# Patient Record
Sex: MALE | Race: WHITE | NOT HISPANIC OR LATINO | Employment: FULL TIME | URBAN - METROPOLITAN AREA
[De-identification: names, ages, dates, MRNs, and addresses within clinical notes are randomized per-mention and may not be internally consistent; named-entity substitution may affect disease eponyms.]

---

## 2018-05-14 LAB
HDLC SERPL-MCNC: 39 MG/DL (ref 35–70)
LDLC SERPL CALC-MCNC: 94 MG/DL (ref 0–160)

## 2018-10-22 ENCOUNTER — TELEPHONE (OUTPATIENT)
Dept: INTERNAL MEDICINE | Facility: CLINIC | Age: 53
End: 2018-10-22

## 2018-10-22 ENCOUNTER — CLINICAL SUPPORT (OUTPATIENT)
Dept: INTERNAL MEDICINE | Facility: CLINIC | Age: 53
End: 2018-10-22
Payer: COMMERCIAL

## 2018-10-22 ENCOUNTER — OFFICE VISIT (OUTPATIENT)
Dept: INTERNAL MEDICINE | Facility: CLINIC | Age: 53
End: 2018-10-22
Payer: COMMERCIAL

## 2018-10-22 DIAGNOSIS — Z00.00 ROUTINE GENERAL MEDICAL EXAMINATION AT A HEALTH CARE FACILITY: Primary | ICD-10-CM

## 2018-10-22 DIAGNOSIS — Z23 NEED FOR VACCINATION FOR H FLU TYPE B: Primary | ICD-10-CM

## 2018-10-22 PROCEDURE — 90471 IMMUNIZATION ADMIN: CPT | Mod: S$GLB,,, | Performed by: NURSE PRACTITIONER

## 2018-10-22 PROCEDURE — 99499 UNLISTED E&M SERVICE: CPT | Mod: S$GLB,,, | Performed by: NURSE PRACTITIONER

## 2018-10-22 PROCEDURE — 90471 FLU VACCINE - QUADRIVALENT (RECOMBINANT) PRESERVATIVE FREE: ICD-10-PCS | Mod: S$GLB,,, | Performed by: NURSE PRACTITIONER

## 2018-10-22 PROCEDURE — 99999 PR PBB SHADOW E&M-EST. PATIENT-LVL II: ICD-10-PCS | Mod: PBBFAC,,,

## 2018-10-22 PROCEDURE — 90682 FLU VACCINE - QUADRIVALENT (RECOMBINANT) PRESERVATIVE FREE: ICD-10-PCS | Mod: S$GLB,,, | Performed by: NURSE PRACTITIONER

## 2018-10-22 PROCEDURE — 99499 NO LOS: ICD-10-PCS | Mod: S$GLB,,, | Performed by: NURSE PRACTITIONER

## 2018-10-22 PROCEDURE — 99999 PR PBB SHADOW E&M-EST. PATIENT-LVL II: CPT | Mod: PBBFAC,,,

## 2018-10-22 PROCEDURE — 92551 PURE TONE HEARING TEST AIR: CPT | Mod: ,,, | Performed by: NURSE PRACTITIONER

## 2018-10-22 PROCEDURE — 90682 RIV4 VACC RECOMBINANT DNA IM: CPT | Mod: S$GLB,,, | Performed by: NURSE PRACTITIONER

## 2018-10-24 NOTE — PROGRESS NOTES
Domenico Ford presents for immunizations.      Screening questions for immunizations:  1. Are you sick today?  no  2. Do you have allergies to medications, foods, or any vaccines?  no  3. Have you ever had a serious reaction after receiving a vaccination?  no  4. Do you have a long-term health problem with heart disease, asthma, lung disease, kidney disease, metabolic disease (e.g. diabetes), anemia, or other blood disorder?  no  5. Have you had a seizure, brain problem, or other nervous system problem?  no  6. Do you have cancer, leukemia, AIDS, or any other immune system problem?  no  7. Do you take cortisone, prednisone, other steroids, anticancer drugs or have you had radiation treatments?  no  8. Have you received a transfusion of blood or blood products, or been given immune (gamma) globulin or an antiviral drug in the past year?  no  9. Have you received vaccinations in the past 4 weeks?  no  FEMALES ONLY: Are you pregnant or is there a chance you could become pregnant during the next month?  noAdministered flu vacc.

## 2018-11-27 NOTE — PROGRESS NOTES
Domenico Ford presented for {[facility] MEDICARE AWV INITIAL/FOLLOWUP:01651} Medicare AWV today. The following components were reviewed and updated:    · Medical history  · Family History  · Social history  · Allergies and Current Medications  · Health Risk Assessment  · Health Maintenance  · Care Team    **See Completed Assessments for Annual Wellness visit with in the encounter summary    The following assessments were completed:  · Depression Screening  · Cognitive function Screening  · Timed Get Up Test  · Whisper Test    There were no vitals filed for this visit.  There is no height or weight on file to calculate BMI.   ]        Diagnoses and health risks identified today and associated recommendations/orders:  1. Need for vaccination for H flu type B  ***      Provided Domenico with a 5-10 year written screening schedule and personal prevention plan. Recommendations were developed using the USPSTF age appropriate recommendations. Education, counseling, and referrals were provided as needed.  After Visit Summary printed and given to patient which includes a list of additional screenings\tests needed.    Follow-up if symptoms worsen or fail to improve.      Haylee Keller LPN

## 2019-07-08 NOTE — PROGRESS NOTES
Patient ID:  Domenico Ford is a 54 y.o. male who presents for  of No chief complaint on file.      Health literacy: {DESC;  Activities:   Nicotine:   Alcohol:   Illicit drugs:   Cardiac symptoms:   Home BP:  Medication compliance:   Diet: regular, diabetic, Mediterranean  Caffeine:   Labs:   Last Echo:   Last stress test:   Cardiovascular angiogram:   ECG:   Fundoscopic exam:     No flowsheet data found.      HPI    ROS     Objective:    Physical Exam      Assessment:       1. Need for vaccination for H flu type B         Plan:         Need for vaccination for H flu type B

## 2019-07-29 NOTE — PROGRESS NOTES
This patient was seen at [facility] on 10- for nurses visit, flu shot. At that time, Kenney Sanabria was the FNP at that clinic. She is no longer with [facility] and this was a nurses visit for Flu Shot.   After review with Dr. Gibbs, Yumiko Hayes RN, Jelani Lynch RN, I am now closing this encounter.

## 2019-11-19 ENCOUNTER — IMMUNIZATION (OUTPATIENT)
Dept: INTERNAL MEDICINE | Facility: CLINIC | Age: 54
End: 2019-11-19
Payer: COMMERCIAL

## 2019-11-19 PROCEDURE — 90686 FLU VACCINE (QUAD) GREATER THAN OR EQUAL TO 3YO PRESERVATIVE FREE IM: ICD-10-PCS | Mod: S$GLB,,, | Performed by: NURSE PRACTITIONER

## 2019-11-19 PROCEDURE — 90471 FLU VACCINE (QUAD) GREATER THAN OR EQUAL TO 3YO PRESERVATIVE FREE IM: ICD-10-PCS | Mod: S$GLB,,, | Performed by: NURSE PRACTITIONER

## 2019-11-19 PROCEDURE — 90686 IIV4 VACC NO PRSV 0.5 ML IM: CPT | Mod: S$GLB,,, | Performed by: NURSE PRACTITIONER

## 2019-11-19 PROCEDURE — 90471 IMMUNIZATION ADMIN: CPT | Mod: S$GLB,,, | Performed by: NURSE PRACTITIONER

## 2019-11-19 NOTE — PROGRESS NOTES
Influenza vaccine given in left deltoid Patient tolerated well. Prior to vaccine being given, we did review vaccine administration record and questionnaire.  Side effects reviewed with patient. Patient has had the flu shot before.

## 2020-10-15 ENCOUNTER — CLINICAL SUPPORT (OUTPATIENT)
Dept: INTERNAL MEDICINE | Facility: CLINIC | Age: 55
End: 2020-10-15

## 2020-10-15 PROCEDURE — 92551 PR PURE TONE HEARING TEST, AIR: ICD-10-PCS | Mod: ,,, | Performed by: NURSE PRACTITIONER

## 2020-10-15 PROCEDURE — 92551 PURE TONE HEARING TEST AIR: CPT | Mod: ,,, | Performed by: NURSE PRACTITIONER

## 2020-11-03 ENCOUNTER — HOSPITAL ENCOUNTER (OUTPATIENT)
Dept: RADIOLOGY | Facility: HOSPITAL | Age: 55
Discharge: HOME | End: 2020-11-03
Attending: NURSE PRACTITIONER
Payer: COMMERCIAL

## 2020-11-03 DIAGNOSIS — R10.9 ABDOMINAL CRAMPS: Primary | ICD-10-CM

## 2020-11-03 DIAGNOSIS — R10.9 ABDOMINAL CRAMPS: ICD-10-CM

## 2020-11-03 DIAGNOSIS — K21.9 ESOPHAGEAL REFLUX: ICD-10-CM

## 2020-11-03 PROCEDURE — 76700 US EXAM ABDOM COMPLETE: CPT | Mod: TC,PO

## 2020-11-17 ENCOUNTER — OFFICE VISIT (OUTPATIENT)
Dept: SURGERY | Facility: CLINIC | Age: 55
End: 2020-11-17
Payer: COMMERCIAL

## 2020-11-17 VITALS — DIASTOLIC BLOOD PRESSURE: 88 MMHG | HEART RATE: 69 BPM | WEIGHT: 189.81 LBS | SYSTOLIC BLOOD PRESSURE: 131 MMHG

## 2020-11-17 DIAGNOSIS — K80.50 BILIARY COLIC: Primary | ICD-10-CM

## 2020-11-17 DIAGNOSIS — Z01.818 PREOP TESTING: ICD-10-CM

## 2020-11-17 PROCEDURE — 1126F AMNT PAIN NOTED NONE PRSNT: CPT | Mod: S$GLB,,, | Performed by: SURGERY

## 2020-11-17 PROCEDURE — 1126F PR PAIN SEVERITY QUANTIFIED, NO PAIN PRESENT: ICD-10-PCS | Mod: S$GLB,,, | Performed by: SURGERY

## 2020-11-17 PROCEDURE — 99999 PR PBB SHADOW E&M-EST. PATIENT-LVL V: CPT | Mod: PBBFAC,,, | Performed by: SURGERY

## 2020-11-17 PROCEDURE — 99204 PR OFFICE/OUTPT VISIT, NEW, LEVL IV, 45-59 MIN: ICD-10-PCS | Mod: S$GLB,,, | Performed by: SURGERY

## 2020-11-17 PROCEDURE — 99999 PR PBB SHADOW E&M-EST. PATIENT-LVL V: ICD-10-PCS | Mod: PBBFAC,,, | Performed by: SURGERY

## 2020-11-17 PROCEDURE — 99204 OFFICE O/P NEW MOD 45 MIN: CPT | Mod: S$GLB,,, | Performed by: SURGERY

## 2020-11-17 RX ORDER — ATORVASTATIN CALCIUM 10 MG/1
TABLET, FILM COATED ORAL NIGHTLY
COMMUNITY
End: 2021-08-10 | Stop reason: SDUPTHER

## 2020-11-17 RX ORDER — LISINOPRIL 20 MG/1
TABLET ORAL
COMMUNITY
End: 2021-08-12 | Stop reason: SDUPTHER

## 2020-11-17 RX ORDER — TESTOSTERONE GEL, 1% 10 MG/G
5 GEL TRANSDERMAL
COMMUNITY
End: 2020-11-30

## 2020-11-17 RX ORDER — SODIUM CHLORIDE 9 MG/ML
INJECTION, SOLUTION INTRAVENOUS CONTINUOUS
Status: CANCELLED | OUTPATIENT
Start: 2020-12-03

## 2020-11-17 RX ORDER — METOPROLOL SUCCINATE 50 MG/1
TABLET, EXTENDED RELEASE ORAL NIGHTLY
COMMUNITY
End: 2021-08-12 | Stop reason: SDUPTHER

## 2020-11-17 RX ORDER — POTASSIUM CHLORIDE 750 MG/1
TABLET, EXTENDED RELEASE ORAL
COMMUNITY
End: 2021-08-12 | Stop reason: SDUPTHER

## 2020-11-17 NOTE — PATIENT INSTRUCTIONS
..PRE-OP INSTRUCTIONS    Your procedure has been scheduled at:    [facility]pre-admit nurse  [phone]      DAY thursday    DATE 12/03/2020       Please call the pre-op nurse to schedule your pre-op testing and registration  Someone from the hospital will call you the evening before surgery to let you know what time you need to be at the hospital for surgery.                                               1:  DO NOT EAT OR DRINK ANYTHING AFTER MIDNIGHT THE NIGHT BEFORE SURGERY.     2:  You will need to stop any blood thinners 1 week prior to surgery.  This includes Aspirin, fish oil, Pradaxa, Coumadin, Plavix, Pletal.  Please contact the prescribing doctor to be sure it is ok to stop these medicines.    3:  Pre-admit nurse will go over your medicines and let you know which ones not to take the morning of surgery    4:  Plan to have someone drive you home after you are released from the hospital.  You WILL NOT be able to drive yourself.    5:  If you have any questions or need to change your surgery date, please call Yelena at [phone]    AFTER SURGERY:    You can shower 48 hours after surgery, REMOVE WET BANDAGES AND BANDAIDS, leave the steri- strips on.  If you have not had a bowel movement within 3 days after surgery you may take a laxative of your choice.  Do not lift anything over 5-10 pounds.    You need to have a follow up appointment 7-14 days after surgery, call the office to schedule or if you have questions or concerns.    For [facility] patients, you will receive a [facility] message with a link to schedule your post op appointment.

## 2020-11-17 NOTE — LETTER
December 2, 2020      Waqas Yun MD  [address]  [place] [address]           [facility] - General Surgery  [address]  [place] [address]  Phone: [phone]          Patient: Domenico Ford   MR Number: [id number]   YOB: 1965   Date of Visit: 11/17/2020       Dear Dr. Waqas Yun:    Thank you for referring Domenico Ford to me for evaluation. Attached you will find relevant portions of my assessment and plan of care.    If you have questions, please do not hesitate to call me. I look forward to following Domenico Ford along with you.    Sincerely,    Reed Moulton MD    Enclosure  CC:  No Recipients    If you would like to receive this communication electronically, please contact [email or web address] or [phone] to request more information on [employer] Link access.    For providers and/or their staff who would like to refer a patient to [facility], please contact us through our one-stop-shop provider referral line, [facility], at [phone].    If you feel you have received this communication in error or would no longer like to receive these types of communications, please e-mail [email or web address]

## 2020-11-30 ENCOUNTER — LAB VISIT (OUTPATIENT)
Dept: PRIMARY CARE CLINIC | Facility: CLINIC | Age: 55
End: 2020-11-30
Payer: COMMERCIAL

## 2020-11-30 ENCOUNTER — HOSPITAL ENCOUNTER (OUTPATIENT)
Dept: PREADMISSION TESTING | Facility: HOSPITAL | Age: 55
Discharge: HOME | End: 2020-11-30
Attending: SURGERY
Payer: COMMERCIAL

## 2020-11-30 VITALS — BODY MASS INDEX: 26.46 KG/M2 | WEIGHT: 189 LBS | HEIGHT: 71 IN

## 2020-11-30 DIAGNOSIS — Z01.818 PREOP TESTING: ICD-10-CM

## 2020-11-30 DIAGNOSIS — Z01.818 PREOP TESTING: Primary | ICD-10-CM

## 2020-11-30 PROCEDURE — 93010 ELECTROCARDIOGRAM REPORT: CPT | Mod: ,,, | Performed by: SPECIALIST

## 2020-11-30 PROCEDURE — 93010 EKG 12-LEAD: ICD-10-PCS | Mod: ,,, | Performed by: SPECIALIST

## 2020-11-30 PROCEDURE — 99900104 DSU ONLY-NO CHARGE-EA ADD'L HR (STAT)

## 2020-11-30 PROCEDURE — U0003 INFECTIOUS AGENT DETECTION BY NUCLEIC ACID (DNA OR RNA); SEVERE ACUTE RESPIRATORY SYNDROME CORONAVIRUS 2 (SARS-COV-2) (CORONAVIRUS DISEASE [COVID-19]), AMPLIFIED PROBE TECHNIQUE, MAKING USE OF HIGH THROUGHPUT TECHNOLOGIES AS DESCRIBED BY CMS-2020-01-R: HCPCS

## 2020-11-30 PROCEDURE — 93005 ELECTROCARDIOGRAM TRACING: CPT

## 2020-11-30 PROCEDURE — 99900103 DSU ONLY-NO CHARGE-INITIAL HR (STAT)

## 2020-11-30 NOTE — DISCHARGE INSTRUCTIONS
To confirm, Your doctor has instructed you that surgery is scheduled for:     Please report to [facility], Registration the morning of surgery. You must check-in and receive a wristband before going to your procedure.    Pre-Op will call the afternoon prior to surgery between 1:00 and 6:00 PM with the final arrival time.  Phone number: [phone]    PLEASE NOTE:  The surgery schedule has many variables which may affect the time of your surgery case.  Family members should be available if your surgery time changes.  Plan to be here the day of your procedure between 4-6 hours.    MEDICATIONS:  TAKE ONLY THESE MEDICATIONS WITH A SMALL SIP OF WATER THE MORNING OF YOUR PROCEDURE:      DO NOT TAKE THESE MEDICATIONS 5-7 DAYS PRIOR to your procedure or per your surgeon's request: ASPIRIN, ALEVE, ADVIL, IBUPROFEN, FISH OIL VITAMIN E, HERBALS  (May take Tylenol)    ONLY if you are prescribed any types of blood thinners such as:  Aspirin, Coumadin, Plavix, Pradaxa, Xarelto, Aggrenox, Effient, Eliquis, Savasya, Brilinta, or any other, ask your surgeon whether you should stop taking them and how long before surgery you should stop.  You may also need to verify with the prescribing physician if it is ok to stop your medication.      INSTRUCTIONS IMPORTANT!!  · Do not eat or drink anything between midnight and the time of your procedure- this includes gum, mints, and candy.  · Do not smoke or drink alcoholic beverages 24 hours prior to your procedure.  · Shower the night before AND the morning of your procedure with a Chlorhexidine wash such as Hibiclens or Dial antibacterial soap from the neck down.  Do not get it on your face or in your eyes.  You may use your own shampoo and face wash. This helps your skin to be as bacteria free as possible.    · If you wear contact lenses, dentures, hearing aids or glasses, bring a container to put them in during surgery and give to a family member for safe keeping.   Please leave all jewelry, piercing's and valuables at home.   · DO NOT remove hair from the surgery site.  Do not shave the incision site unless you are given specific instructions to do so.    · ONLY if you have been diagnosed with sleep apnea please bring your C-PAP machine.  · ONLY if you wear home oxygen please bring your portable oxygen tank the day of your procedure.  · ONLY if you have a history of OPEN HEART SURGERY you will need a clearance from your Cardiologist per Anesthesia.      · ONLY for patients requiring bowel prep, written instructions will be given by your doctor's office.  · ONLY if you have a neuro stimulator, please bring the controller with you the morning of surgery  · ONLY if a type and screen test is needed before surgery, please return:  · If your doctor has scheduled you for an overnight stay, bring a small overnight bag with any personal items you need.  · Make arrangements in advance for transportation home by a responsible adult.  It is not safe to drive a vehicle during the 24 hours after anesthesia.     · ONLY ONE VISITOR PER PATIENT IS ALLOWED TO COME IN THE HOSPITAL THE DAY OF PROCEDURE.   · Visiting hours are 8:00AM-6:00PM. For the safety of all patients, visitors under the age of 12 are not allowed above the first floor of the hospital.    · All [facility] facilities and properties are tobacco free.  Smoking is NOT allowed.       If you have any questions about these instructions, call Pre-Op Admit  Nursing at [phone] or the Pre-Op Day Surgery Unit at [phone].

## 2020-12-01 LAB — SARS-COV-2 RNA RESP QL NAA+PROBE: NOT DETECTED

## 2020-12-02 ENCOUNTER — ANESTHESIA EVENT (OUTPATIENT)
Dept: SURGERY | Facility: HOSPITAL | Age: 55
End: 2020-12-02
Payer: COMMERCIAL

## 2020-12-02 NOTE — PROGRESS NOTES
Subjective:       Patient ID: Domenico Ford is a 55 y.o. male.    Chief Complaint: Consult (biliary colic)      HPI 55-year-old male presents with a complaint of right upper quadrant pain and epigastric pain.  This has been going on for a couple of years.  His EGD was unremarkable.  He denies nausea or vomiting.  He denies fever or chills.  The pain is worse after meals especially greasy food.  Ultrasound shows evidence of cholelithiasis.  Pain radiates into the back.    Past Medical History:   Diagnosis Date    Hypertension     Stroke 2017    HEMMORGIC    Wears glasses      Past Surgical History:   Procedure Laterality Date    [facility]IA REPAIR      [facility]         Current Outpatient Medications:     atorvastatin (LIPITOR) 10 MG tablet, every evening. , Disp: , Rfl:     lisinopriL (PRINIVIL,ZESTRIL) 20 MG tablet, lisinopril 20 mg tablet  TK 1 T PO QAM, Disp: , Rfl:     metoprolol succinate (TOPROL-XL) 50 MG 24 hr tablet, every evening. , Disp: , Rfl:     potassium chloride (KLOR-CON) 10 MEQ TbSR, as needed. , Disp: , Rfl:     calcium carbonate/vitamin D3 (VITAMIN D-3 ORAL), Take by mouth once daily., Disp: , Rfl:     multivitamin capsule, Take 1 capsule by mouth once daily., Disp: , Rfl:     Review of patient's allergies indicates:   Allergen Reactions    Hydrocodone-acetaminophen Other (See Comments)     SHAKING, DECREASED MOTOR SKILLS    Oxycodone-acetaminophen     Hydrocodone-ibuprofen Nausea And Vomiting       History reviewed. No pertinent family history.  Social History     Socioeconomic History    Marital status:      Spouse name: Not on file    Number of children: Not on file    Years of education: Not on file    Highest education level: Not on file   Occupational History    Not on file   Social Needs    Financial resource strain: Not on file    Food insecurity     Worry: Not on file     Inability: Not on file    Transportation needs     Medical: Not on file     Non-medical: Not on file    Tobacco Use    Smoking status: Never Smoker    Smokeless tobacco: Never Used   Substance and Sexual Activity    Alcohol use: Yes     Frequency: Never     Comment: OCC    Drug use: Never    Sexual activity: Never   Lifestyle    Physical activity     Days per week: Not on file     Minutes per session: Not on file    Stress: Not on file   Relationships    Social connections     Talks on phone: Not on file     Gets together: Not on file     Attends Synagogue service: Not on file     Active member of club or organization: Not on file     Attends meetings of clubs or organizations: Not on file     Relationship status: Not on file   Other Topics Concern    Not on file   Social History Narrative    Not on file       Review of Systems   Constitutional: Negative for chills, fatigue, fever and unexpected weight change.   HENT: Negative for congestion, sore throat, trouble swallowing and voice change.    Eyes: Negative for redness and visual disturbance.   Respiratory: Negative for cough, shortness of breath and wheezing.    Cardiovascular: Negative for chest pain and palpitations.   Gastrointestinal: Positive for abdominal pain. Negative for blood in stool, nausea and vomiting.   Genitourinary: Negative for dysuria, frequency, hematuria and urgency.   Musculoskeletal: Negative for arthralgias, myalgias and neck pain.   Skin: Negative for rash and wound.   Allergic/Immunologic: Negative.    Neurological: Negative for tremors, seizures, weakness and headaches.   Hematological: Does not bruise/bleed easily.   Psychiatric/Behavioral: Negative for confusion and dysphoric mood. The patient is not nervous/anxious.      Objective:     Physical Exam  Constitutional:       General: He is not in acute distress.     Appearance: He is well-developed.   HENT:      Head: Normocephalic and atraumatic.      Mouth/Throat:      Pharynx: No oropharyngeal exudate.   Eyes:      General: No scleral icterus.     Conjunctiva/sclera:  Conjunctivae normal.      Pupils: Pupils are equal, round, and reactive to light.   Neck:      Musculoskeletal: Normal range of motion and neck supple.      Thyroid: No thyromegaly.   Cardiovascular:      Rate and Rhythm: Normal rate and regular rhythm.      Heart sounds: Normal heart sounds. No murmur.   Pulmonary:      Effort: Pulmonary effort is normal. No respiratory distress.      Breath sounds: Normal breath sounds. No wheezing or rales.   Abdominal:      General: Bowel sounds are normal. There is no distension.      Palpations: Abdomen is soft.      Tenderness: There is abdominal tenderness (Very mild epigastric and right upper quadrant tenderness.). There is no guarding or rebound.      Hernia: No hernia is present.   Musculoskeletal: Normal range of motion.         General: No tenderness.   Lymphadenopathy:      Cervical: No cervical adenopathy.   Skin:     General: Skin is warm and dry.      Findings: No erythema or rash.   Neurological:      Mental Status: He is alert and oriented to person, place, and time.      Cranial Nerves: No cranial nerve deficit.   Psychiatric:         Behavior: Behavior normal.         Judgment: Judgment normal.          IMPRESSION:  1. Multiple intraluminal gallstones, with gallbladder sludge. No  evidence to indicate acute cholecystitis. There is borderline  gallbladder wall thickening, which may indicate chronic cholecystitis.  Correlate with clinical findings.  2. Poor visualization of the pancreas and midline retroperitoneum.    Assessment:     Encounter Diagnoses   Name Primary?    Biliary colic Yes    Preop testing        Plan:      1.  Plan laparoscopic, possible open cholecystectomy.  2. Risks and benefits of the planned procedure were discussed at length with the patient.  Risks and benefits of not proceeding with the procedure were discussed as well. All questions were answered. The patient expressed clear understanding and would like to proceed with the procedure as  discussed.

## 2020-12-02 NOTE — H&P (VIEW-ONLY)
Subjective:       Patient ID: Domenico Ford is a 55 y.o. male.    Chief Complaint: Consult (biliary colic)      HPI 55-year-old male presents with a complaint of right upper quadrant pain and epigastric pain.  This has been going on for a couple of years.  His EGD was unremarkable.  He denies nausea or vomiting.  He denies fever or chills.  The pain is worse after meals especially greasy food.  Ultrasound shows evidence of cholelithiasis.  Pain radiates into the back.    Past Medical History:   Diagnosis Date    Hypertension     Stroke 2017    HEMMORGIC    Wears glasses      Past Surgical History:   Procedure Laterality Date    [facility]      [facility]         Current Outpatient Medications:     atorvastatin (LIPITOR) 10 MG tablet, every evening. , Disp: , Rfl:     lisinopriL (PRINIVIL,ZESTRIL) 20 MG tablet, lisinopril 20 mg tablet  TK 1 T PO QAM, Disp: , Rfl:     metoprolol succinate (TOPROL-XL) 50 MG 24 hr tablet, every evening. , Disp: , Rfl:     potassium chloride (KLOR-CON) 10 MEQ TbSR, as needed. , Disp: , Rfl:     calcium carbonate/vitamin D3 (VITAMIN D-3 ORAL), Take by mouth once daily., Disp: , Rfl:     multivitamin capsule, Take 1 capsule by mouth once daily., Disp: , Rfl:     Review of patient's allergies indicates:   Allergen Reactions    Hydrocodone-acetaminophen Other (See Comments)     SHAKING, DECREASED MOTOR SKILLS    Oxycodone-acetaminophen     Hydrocodone-ibuprofen Nausea And Vomiting       History reviewed. No pertinent family history.  Social History     Socioeconomic History    Marital status:      Spouse name: Not on file    Number of children: Not on file    Years of education: Not on file    Highest education level: Not on file   Occupational History    Not on file   Social Needs    Financial resource strain: Not on file    Food insecurity     Worry: Not on file     Inability: Not on file    Transportation needs     Medical: Not on file     Non-medical: Not on file    Tobacco Use    Smoking status: Never Smoker    Smokeless tobacco: Never Used   Substance and Sexual Activity    Alcohol use: Yes     Frequency: Never     Comment: OCC    Drug use: Never    Sexual activity: Never   Lifestyle    Physical activity     Days per week: Not on file     Minutes per session: Not on file    Stress: Not on file   Relationships    Social connections     Talks on phone: Not on file     Gets together: Not on file     Attends Zoroastrianism service: Not on file     Active member of club or organization: Not on file     Attends meetings of clubs or organizations: Not on file     Relationship status: Not on file   Other Topics Concern    Not on file   Social History Narrative    Not on file       Review of Systems   Constitutional: Negative for chills, fatigue, fever and unexpected weight change.   HENT: Negative for congestion, sore throat, trouble swallowing and voice change.    Eyes: Negative for redness and visual disturbance.   Respiratory: Negative for cough, shortness of breath and wheezing.    Cardiovascular: Negative for chest pain and palpitations.   Gastrointestinal: Positive for abdominal pain. Negative for blood in stool, nausea and vomiting.   Genitourinary: Negative for dysuria, frequency, hematuria and urgency.   Musculoskeletal: Negative for arthralgias, myalgias and neck pain.   Skin: Negative for rash and wound.   Allergic/Immunologic: Negative.    Neurological: Negative for tremors, seizures, weakness and headaches.   Hematological: Does not bruise/bleed easily.   Psychiatric/Behavioral: Negative for confusion and dysphoric mood. The patient is not nervous/anxious.      Objective:     Physical Exam  Constitutional:       General: He is not in acute distress.     Appearance: He is well-developed.   HENT:      Head: Normocephalic and atraumatic.      Mouth/Throat:      Pharynx: No oropharyngeal exudate.   Eyes:      General: No scleral icterus.     Conjunctiva/sclera:  Conjunctivae normal.      Pupils: Pupils are equal, round, and reactive to light.   Neck:      Musculoskeletal: Normal range of motion and neck supple.      Thyroid: No thyromegaly.   Cardiovascular:      Rate and Rhythm: Normal rate and regular rhythm.      Heart sounds: Normal heart sounds. No murmur.   Pulmonary:      Effort: Pulmonary effort is normal. No respiratory distress.      Breath sounds: Normal breath sounds. No wheezing or rales.   Abdominal:      General: Bowel sounds are normal. There is no distension.      Palpations: Abdomen is soft.      Tenderness: There is abdominal tenderness (Very mild epigastric and right upper quadrant tenderness.). There is no guarding or rebound.      Hernia: No hernia is present.   Musculoskeletal: Normal range of motion.         General: No tenderness.   Lymphadenopathy:      Cervical: No cervical adenopathy.   Skin:     General: Skin is warm and dry.      Findings: No erythema or rash.   Neurological:      Mental Status: He is alert and oriented to person, place, and time.      Cranial Nerves: No cranial nerve deficit.   Psychiatric:         Behavior: Behavior normal.         Judgment: Judgment normal.          IMPRESSION:  1. Multiple intraluminal gallstones, with gallbladder sludge. No  evidence to indicate acute cholecystitis. There is borderline  gallbladder wall thickening, which may indicate chronic cholecystitis.  Correlate with clinical findings.  2. Poor visualization of the pancreas and midline retroperitoneum.    Assessment:     Encounter Diagnoses   Name Primary?    Biliary colic Yes    Preop testing        Plan:      1.  Plan laparoscopic, possible open cholecystectomy.  2. Risks and benefits of the planned procedure were discussed at length with the patient.  Risks and benefits of not proceeding with the procedure were discussed as well. All questions were answered. The patient expressed clear understanding and would like to proceed with the procedure as  discussed.

## 2020-12-03 ENCOUNTER — ANESTHESIA (OUTPATIENT)
Dept: SURGERY | Facility: HOSPITAL | Age: 55
End: 2020-12-03
Payer: COMMERCIAL

## 2020-12-03 ENCOUNTER — HOSPITAL ENCOUNTER (OUTPATIENT)
Facility: HOSPITAL | Age: 55
Discharge: HOME | End: 2020-12-03
Attending: SURGERY | Admitting: SURGERY
Payer: COMMERCIAL

## 2020-12-03 DIAGNOSIS — K80.50 BILIARY COLIC: Primary | ICD-10-CM

## 2020-12-03 PROCEDURE — 99900104 DSU ONLY-NO CHARGE-EA ADD'L HR (STAT): Performed by: SURGERY

## 2020-12-03 PROCEDURE — 71000033 HC RECOVERY, INTIAL HOUR: Performed by: SURGERY

## 2020-12-03 PROCEDURE — 88304 TISSUE EXAM BY PATHOLOGIST: CPT | Mod: 26,,, | Performed by: PATHOLOGY

## 2020-12-03 PROCEDURE — 63600175 PHARM REV CODE 636 W HCPCS: Performed by: NURSE ANESTHETIST, CERTIFIED REGISTERED

## 2020-12-03 PROCEDURE — 25000003 PHARM REV CODE 250: Performed by: NURSE ANESTHETIST, CERTIFIED REGISTERED

## 2020-12-03 PROCEDURE — 71000039 HC RECOVERY, EACH ADD'L HOUR: Performed by: SURGERY

## 2020-12-03 PROCEDURE — 25000003 PHARM REV CODE 250: Performed by: ANESTHESIOLOGY

## 2020-12-03 PROCEDURE — 99900103 DSU ONLY-NO CHARGE-INITIAL HR (STAT): Performed by: SURGERY

## 2020-12-03 PROCEDURE — 27201423 OPTIME MED/SURG SUP & DEVICES STERILE SUPPLY: Performed by: SURGERY

## 2020-12-03 PROCEDURE — 36000709 HC OR TIME LEV III EA ADD 15 MIN: Performed by: SURGERY

## 2020-12-03 PROCEDURE — D9220A PRA ANESTHESIA: ICD-10-PCS | Mod: ,,, | Performed by: NURSE ANESTHETIST, CERTIFIED REGISTERED

## 2020-12-03 PROCEDURE — D9220A PRA ANESTHESIA: Mod: ,,, | Performed by: ANESTHESIOLOGY

## 2020-12-03 PROCEDURE — 37000009 HC ANESTHESIA EA ADD 15 MINS: Performed by: SURGERY

## 2020-12-03 PROCEDURE — 63600175 PHARM REV CODE 636 W HCPCS: Performed by: SURGERY

## 2020-12-03 PROCEDURE — 36000708 HC OR TIME LEV III 1ST 15 MIN: Performed by: SURGERY

## 2020-12-03 PROCEDURE — 88304 PR  SURG PATH,LEVEL III: ICD-10-PCS | Mod: 26,,, | Performed by: PATHOLOGY

## 2020-12-03 PROCEDURE — D9220A PRA ANESTHESIA: ICD-10-PCS | Mod: ,,, | Performed by: ANESTHESIOLOGY

## 2020-12-03 PROCEDURE — 71000015 HC POSTOP RECOV 1ST HR: Performed by: SURGERY

## 2020-12-03 PROCEDURE — 37000008 HC ANESTHESIA 1ST 15 MINUTES: Performed by: SURGERY

## 2020-12-03 PROCEDURE — 63600175 PHARM REV CODE 636 W HCPCS: Performed by: ANESTHESIOLOGY

## 2020-12-03 PROCEDURE — 47562 PR LAP,CHOLECYSTECTOMY: ICD-10-PCS | Mod: ,,, | Performed by: SURGERY

## 2020-12-03 PROCEDURE — 47562 LAPAROSCOPIC CHOLECYSTECTOMY: CPT | Mod: ,,, | Performed by: SURGERY

## 2020-12-03 PROCEDURE — 25000003 PHARM REV CODE 250: Performed by: SURGERY

## 2020-12-03 PROCEDURE — D9220A PRA ANESTHESIA: Mod: ,,, | Performed by: NURSE ANESTHETIST, CERTIFIED REGISTERED

## 2020-12-03 PROCEDURE — 71000016 HC POSTOP RECOV ADDL HR: Performed by: SURGERY

## 2020-12-03 PROCEDURE — 88304 TISSUE EXAM BY PATHOLOGIST: CPT | Performed by: PATHOLOGY

## 2020-12-03 RX ORDER — NEOSTIGMINE METHYLSULFATE 1 MG/ML
INJECTION, SOLUTION INTRAVENOUS
Status: DISCONTINUED | OUTPATIENT
Start: 2020-12-03 | End: 2020-12-03

## 2020-12-03 RX ORDER — MIDAZOLAM HYDROCHLORIDE 1 MG/ML
INJECTION INTRAMUSCULAR; INTRAVENOUS
Status: DISCONTINUED | OUTPATIENT
Start: 2020-12-03 | End: 2020-12-03

## 2020-12-03 RX ORDER — DEXAMETHASONE SODIUM PHOSPHATE 4 MG/ML
INJECTION, SOLUTION INTRA-ARTICULAR; INTRALESIONAL; INTRAMUSCULAR; INTRAVENOUS; SOFT TISSUE
Status: DISCONTINUED | OUTPATIENT
Start: 2020-12-03 | End: 2020-12-03

## 2020-12-03 RX ORDER — SODIUM CHLORIDE 9 MG/ML
INJECTION, SOLUTION INTRAVENOUS CONTINUOUS
Status: DISCONTINUED | OUTPATIENT
Start: 2020-12-03 | End: 2020-12-03 | Stop reason: HOSPADM

## 2020-12-03 RX ORDER — TESTOSTERONE CYPIONATE 1000 MG/10ML
100 INJECTION, SOLUTION INTRAMUSCULAR
COMMUNITY

## 2020-12-03 RX ORDER — DIPHENHYDRAMINE HYDROCHLORIDE 50 MG/ML
25 INJECTION INTRAMUSCULAR; INTRAVENOUS EVERY 6 HOURS PRN
Status: DISCONTINUED | OUTPATIENT
Start: 2020-12-03 | End: 2020-12-03 | Stop reason: HOSPADM

## 2020-12-03 RX ORDER — SODIUM CHLORIDE, SODIUM LACTATE, POTASSIUM CHLORIDE, CALCIUM CHLORIDE 600; 310; 30; 20 MG/100ML; MG/100ML; MG/100ML; MG/100ML
75 INJECTION, SOLUTION INTRAVENOUS CONTINUOUS
Status: DISCONTINUED | OUTPATIENT
Start: 2020-12-03 | End: 2020-12-03 | Stop reason: HOSPADM

## 2020-12-03 RX ORDER — FENTANYL CITRATE 50 UG/ML
25 INJECTION, SOLUTION INTRAMUSCULAR; INTRAVENOUS EVERY 5 MIN PRN
Status: DISCONTINUED | OUTPATIENT
Start: 2020-12-03 | End: 2020-12-03 | Stop reason: HOSPADM

## 2020-12-03 RX ORDER — KETOROLAC TROMETHAMINE 30 MG/ML
30 INJECTION, SOLUTION INTRAMUSCULAR; INTRAVENOUS ONCE
Status: COMPLETED | OUTPATIENT
Start: 2020-12-03 | End: 2020-12-03

## 2020-12-03 RX ORDER — ONDANSETRON 2 MG/ML
4 INJECTION INTRAMUSCULAR; INTRAVENOUS ONCE AS NEEDED
Status: DISCONTINUED | OUTPATIENT
Start: 2020-12-03 | End: 2020-12-03 | Stop reason: HOSPADM

## 2020-12-03 RX ORDER — SODIUM CHLORIDE 9 MG/ML
INJECTION, SOLUTION INTRAVENOUS CONTINUOUS
Status: CANCELLED | OUTPATIENT
Start: 2020-12-03

## 2020-12-03 RX ORDER — CEFAZOLIN SODIUM 2 G/50ML
2 SOLUTION INTRAVENOUS
Status: COMPLETED | OUTPATIENT
Start: 2020-12-03 | End: 2020-12-03

## 2020-12-03 RX ORDER — OXYCODONE HYDROCHLORIDE 5 MG/1
5 TABLET ORAL
Status: DISCONTINUED | OUTPATIENT
Start: 2020-12-03 | End: 2020-12-03 | Stop reason: HOSPADM

## 2020-12-03 RX ORDER — FENTANYL CITRATE 50 UG/ML
INJECTION, SOLUTION INTRAMUSCULAR; INTRAVENOUS
Status: DISCONTINUED | OUTPATIENT
Start: 2020-12-03 | End: 2020-12-03

## 2020-12-03 RX ORDER — ACETAMINOPHEN 10 MG/ML
INJECTION, SOLUTION INTRAVENOUS
Status: DISCONTINUED | OUTPATIENT
Start: 2020-12-03 | End: 2020-12-03

## 2020-12-03 RX ORDER — SUCCINYLCHOLINE CHLORIDE 20 MG/ML
INJECTION INTRAMUSCULAR; INTRAVENOUS
Status: DISCONTINUED | OUTPATIENT
Start: 2020-12-03 | End: 2020-12-03

## 2020-12-03 RX ORDER — SODIUM CHLORIDE 0.9 % (FLUSH) 0.9 %
3 SYRINGE (ML) INJECTION
Status: DISCONTINUED | OUTPATIENT
Start: 2020-12-03 | End: 2020-12-03 | Stop reason: HOSPADM

## 2020-12-03 RX ORDER — PROPOFOL 10 MG/ML
VIAL (ML) INTRAVENOUS
Status: DISCONTINUED | OUTPATIENT
Start: 2020-12-03 | End: 2020-12-03

## 2020-12-03 RX ORDER — HYDROMORPHONE HYDROCHLORIDE 2 MG/ML
0.2 INJECTION, SOLUTION INTRAMUSCULAR; INTRAVENOUS; SUBCUTANEOUS EVERY 5 MIN PRN
Status: DISCONTINUED | OUTPATIENT
Start: 2020-12-03 | End: 2020-12-03 | Stop reason: HOSPADM

## 2020-12-03 RX ORDER — ROCURONIUM BROMIDE 10 MG/ML
INJECTION, SOLUTION INTRAVENOUS
Status: DISCONTINUED | OUTPATIENT
Start: 2020-12-03 | End: 2020-12-03

## 2020-12-03 RX ORDER — TRAMADOL HYDROCHLORIDE 50 MG/1
50 TABLET ORAL EVERY 6 HOURS
Qty: 15 TABLET | Refills: 0 | Status: SHIPPED | OUTPATIENT
Start: 2020-12-03 | End: 2021-06-23

## 2020-12-03 RX ORDER — ONDANSETRON HYDROCHLORIDE 2 MG/ML
INJECTION, SOLUTION INTRAMUSCULAR; INTRAVENOUS
Status: DISCONTINUED | OUTPATIENT
Start: 2020-12-03 | End: 2020-12-03

## 2020-12-03 RX ORDER — PHENYLEPHRINE HYDROCHLORIDE 10 MG/ML
INJECTION INTRAVENOUS
Status: DISCONTINUED | OUTPATIENT
Start: 2020-12-03 | End: 2020-12-03

## 2020-12-03 RX ORDER — LIDOCAINE HYDROCHLORIDE 10 MG/ML
1 INJECTION, SOLUTION EPIDURAL; INFILTRATION; INTRACAUDAL; PERINEURAL ONCE
Status: COMPLETED | OUTPATIENT
Start: 2020-12-03 | End: 2020-12-03

## 2020-12-03 RX ORDER — LIDOCAINE HCL/PF 100 MG/5ML
SYRINGE (ML) INTRAVENOUS
Status: DISCONTINUED | OUTPATIENT
Start: 2020-12-03 | End: 2020-12-03

## 2020-12-03 RX ORDER — BUPIVACAINE HYDROCHLORIDE AND EPINEPHRINE 5; 5 MG/ML; UG/ML
INJECTION, SOLUTION EPIDURAL; INTRACAUDAL; PERINEURAL
Status: DISCONTINUED | OUTPATIENT
Start: 2020-12-03 | End: 2020-12-03 | Stop reason: HOSPADM

## 2020-12-03 RX ADMIN — ONDANSETRON 4 MG: 2 INJECTION, SOLUTION INTRAMUSCULAR; INTRAVENOUS at 09:12

## 2020-12-03 RX ADMIN — CEFAZOLIN SODIUM 2 G: 2 SOLUTION INTRAVENOUS at 09:12

## 2020-12-03 RX ADMIN — ACETAMINOPHEN 1000 MG: 10 INJECTION, SOLUTION INTRAVENOUS at 10:12

## 2020-12-03 RX ADMIN — KETOROLAC TROMETHAMINE 30 MG: 30 INJECTION, SOLUTION INTRAMUSCULAR at 11:12

## 2020-12-03 RX ADMIN — GLYCOPYRROLATE 0.4 MG: 0.2 INJECTION, SOLUTION INTRAMUSCULAR; INTRAVENOUS at 10:12

## 2020-12-03 RX ADMIN — SUCCINYLCHOLINE CHLORIDE 160 MG: 20 INJECTION, SOLUTION INTRAMUSCULAR; INTRAVENOUS; PARENTERAL at 09:12

## 2020-12-03 RX ADMIN — SODIUM CHLORIDE, SODIUM GLUCONATE, SODIUM ACETATE, POTASSIUM CHLORIDE, MAGNESIUM CHLORIDE, SODIUM PHOSPHATE, DIBASIC, AND POTASSIUM PHOSPHATE: .53; .5; .37; .037; .03; .012; .00082 INJECTION, SOLUTION INTRAVENOUS at 10:12

## 2020-12-03 RX ADMIN — PHENYLEPHRINE HYDROCHLORIDE 200 MCG: 10 INJECTION INTRAVENOUS at 10:12

## 2020-12-03 RX ADMIN — FENTANYL CITRATE 100 MCG: 50 INJECTION, SOLUTION INTRAMUSCULAR; INTRAVENOUS at 09:12

## 2020-12-03 RX ADMIN — ROCURONIUM BROMIDE 25 MG: 10 INJECTION, SOLUTION INTRAVENOUS at 10:12

## 2020-12-03 RX ADMIN — PROPOFOL 150 MG: 10 INJECTION, EMULSION INTRAVENOUS at 09:12

## 2020-12-03 RX ADMIN — NEOSTIGMINE METHYLSULFATE 3 MG: 1 INJECTION INTRAVENOUS at 10:12

## 2020-12-03 RX ADMIN — DEXAMETHASONE SODIUM PHOSPHATE 4 MG: 4 INJECTION, SOLUTION INTRA-ARTICULAR; INTRALESIONAL; INTRAMUSCULAR; INTRAVENOUS; SOFT TISSUE at 09:12

## 2020-12-03 RX ADMIN — FENTANYL CITRATE 50 MCG: 50 INJECTION, SOLUTION INTRAMUSCULAR; INTRAVENOUS at 10:12

## 2020-12-03 RX ADMIN — MIDAZOLAM HYDROCHLORIDE 2 MG: 1 INJECTION, SOLUTION INTRAMUSCULAR; INTRAVENOUS at 09:12

## 2020-12-03 RX ADMIN — SODIUM CHLORIDE, SODIUM GLUCONATE, SODIUM ACETATE, POTASSIUM CHLORIDE, MAGNESIUM CHLORIDE, SODIUM PHOSPHATE, DIBASIC, AND POTASSIUM PHOSPHATE 10 ML: .53; .5; .37; .037; .03; .012; .00082 INJECTION, SOLUTION INTRAVENOUS at 08:12

## 2020-12-03 RX ADMIN — LIDOCAINE HYDROCHLORIDE 100 MG: 20 INJECTION, SOLUTION INTRAVENOUS at 09:12

## 2020-12-03 RX ADMIN — ROCURONIUM BROMIDE 5 MG: 10 INJECTION, SOLUTION INTRAVENOUS at 09:12

## 2020-12-03 RX ADMIN — LIDOCAINE HYDROCHLORIDE 10 MG: 10 INJECTION, SOLUTION EPIDURAL; INFILTRATION; INTRACAUDAL; PERINEURAL at 08:12

## 2020-12-03 NOTE — DISCHARGE SUMMARY
Discharge Summary  General Surgery      Admit Date: 12/3/2020    Discharge Date :12/3/2020    Attending Physician: Reed Moulton     Discharge Physician: Reed Moulton    Discharged Condition: good    Discharge Diagnosis: Biliary colic [K80.50]    Treatments/Procedures: Procedure(s) (LRB):  CHOLECYSTECTOMY, LAPAROSCOPIC (N/A)    Hospital Course: Uneventful; Discharged home from [facility]    Significant Diagnostic Studies: none    Disposition: Home or Self Care    Diet: Regular    Follow up: Office 10-14 days    Activity: No heavy lifting till seen in office.    Patient Instructions:   Current Discharge Medication List      START taking these medications    Details   traMADoL (ULTRAM) 50 mg tablet Take 1 tablet (50 mg total) by mouth every 6 (six) hours.  Qty: 15 tablet, Refills: 0    Comments: Quantity prescribed more than 7 day supply? No         CONTINUE these medications which have NOT CHANGED    Details   atorvastatin (LIPITOR) 10 MG tablet every evening.       calcium carbonate/vitamin D3 (VITAMIN D-3 ORAL) Take by mouth once daily.      lisinopriL (PRINIVIL,ZESTRIL) 20 MG tablet lisinopril 20 mg tablet   TK 1 T PO QAM    Comments: .      metoprolol succinate (TOPROL-XL) 50 MG 24 hr tablet every evening.     Comments: .      multivitamin capsule Take 1 capsule by mouth once daily.      potassium chloride (KLOR-CON) 10 MEQ TbSR as needed.       testosterone cypionate (DEPOTESTOTERONE CYPIONATE) 100 mg/mL injection Inject 100 mg into the muscle every 7 days.             Discharge Procedure Orders   Diet general

## 2020-12-03 NOTE — ANESTHESIA PREPROCEDURE EVALUATION
12/03/2020  Domenico Ford is a 55 y.o., male.    Anesthesia Evaluation    I have reviewed the Patient Summary Reports.    I have reviewed the Nursing Notes. I have reviewed the NPO Status.   I have reviewed the Medications.     Review of Systems  Anesthesia Hx:  No problems with previous Anesthesia Denies Hx of Anesthetic complications    Social:  No Alcohol Use    Cardiovascular:   Hypertension Denies MI.  Denies CAD.    Denies CABG/stent.   Denies Angina.    Pulmonary:   Denies COPD.  Denies Asthma.  Denies Recent URI.    Renal/:   Denies Chronic Renal Disease.     Hepatic/GI:   Denies GERD. Denies Liver Disease.    Neurological:   Denies TIA. CVA Denies Seizures.    Endocrine:   Denies Diabetes. Denies Hypothyroidism.    Psych:   Denies Psychiatric History.          Physical Exam  General:  Well nourished    Airway/Jaw/Neck:  Airway Findings: Mouth Opening: Normal Tongue: Normal  General Airway Assessment: Adult, Good  Mallampati: II  Improves to II with phonation.  TM Distance: 4-6 cm      Dental:  Dental Findings: In tact   Chest/Lungs:  Chest/Lungs Findings: Clear to auscultation, Normal Respiratory Rate     Heart/Vascular:  Heart Findings: Rate: Normal  Rhythm: Regular Rhythm  Sounds: Normal  Heart murmur: negative       Mental Status:  Mental Status Findings:  Cooperative, Alert and Oriented         Anesthesia Plan  Type of Anesthesia, risks & benefits discussed:  Anesthesia Type:  general  Patient's Preference:   Intra-op Monitoring Plan:   Intra-op Monitoring Plan Comments:   Post Op Pain Control Plan:   Post Op Pain Control Plan Comments:   Induction:   IV  Beta Blocker:  Patient is on a Beta-Blocker and has received one dose within the past 24 hours (No further documentation required).       Informed Consent: Patient understands risks and agrees with Anesthesia plan.  Questions answered.  Anesthesia consent signed with patient.  ASA Score: 2     Day of Surgery Review of History & Physical: I have interviewed and examined the patient. I have reviewed the patient's H&P dated:  There are no significant changes.  H&P update referred to the surgeon.  H&P completed by Anesthesiologist.       Ready For Surgery From Anesthesia Perspective.

## 2020-12-03 NOTE — ANESTHESIA PROCEDURE NOTES
Intubation  Performed by: Ten Putnam CRNA  Authorized by: Efra Kennedy MD     Intubation:     Induction:  Intravenous    Intubated:  Postinduction    Mask Ventilation:  Easy mask    Attempts:  1    Attempted By:  CRNA    Method of Intubation:  Direct    Blade:  Reanna 3    Laryngeal View Grade: Grade I - full view of chords      Difficult Airway Encountered?: No      Complications:  None    Airway Device:  Oral endotracheal tube    Airway Device Size:  7.5    Style/Cuff Inflation:  Cuffed (inflated to minimal occlusive pressure)    Inflation Amount (mL):  5    Tube secured:  21    Secured at:  The lips    Placement Verified By:  Capnometry    Complicating Factors:  None    Findings Post-Intubation:  BS equal bilateral and atraumatic/condition of teeth unchanged

## 2020-12-03 NOTE — DISCHARGE INSTRUCTIONS
"May shower after pat the steri strips dry  Do not pull off the steri strips,  They will fall off on their own.  Drink plenty of water to hydrate yourself  No lifting anything over 10 pounds until you see the physician for your post op visit    Discharge Instructions: After Your Surgery/Procedure  Youve just had surgery. During surgery you were given medicine called anesthesia to keep you relaxed and free of pain. After surgery you may have some pain or nausea. This is common. Here are some tips for feeling better and getting well after surgery.     Stay on schedule with your medication.   Going home  Your doctor or nurse will show you how to take care of yourself when you go home. He or she will also answer your questions. Have an adult family member or friend drive you home.      For your safety we recommend these precaution for the first 24 hours after your procedure:  · Do not drive or use heavy equipment.  · Do not make important decisions or sign legal papers.  · Do not drink alcohol.  · Have someone stay with you, if needed. He or she can watch for problems and help keep you safe.  · Your concentration, balance, coordination, and judgement may be impaired for many hours after anesthesia.  Use caution when ambulating or standing up.     · You may feel weak and "washed out" after anesthesia and surgery.      Subtle residual effects of general anesthesia or sedation with regional / local anesthesia can last more than 24 hours.  Rest for the remainder of the day or longer if your Doctor/Surgeon has advised you to do so.  Although you may feel normal within the first 24 hours, your reflexes and mental ability may be impaired without you realizing it.  You may feel dizzy, lightheaded or sleepy for 24 hours or longer.      Be sure to go to all follow-up visits with your doctor. And rest after your surgery for as long as your doctor tells you to.  Coping with pain  If you have pain after surgery, pain medicine will " help you feel better. Take it as told, before pain becomes severe. Also, ask your doctor or pharmacist about other ways to control pain. This might be with heat, ice, or relaxation. And follow any other instructions your surgeon or nurse gives you.  Tips for taking pain medicine  To get the best relief possible, remember these points:  · Pain medicines can upset your stomach. Taking them with a little food may help.  · Most pain relievers taken by mouth need at least 20 to 30 minutes to start to work.  · Taking medicine on a schedule can help you remember to take it. Try to time your medicine so that you can take it before starting an activity. This might be before you get dressed, go for a walk, or sit down for dinner.  · Constipation is a common side effect of pain medicines. Call your doctor before taking any medicines such as laxatives or stool softeners to help ease constipation. Also ask if you should skip any foods. Drinking lots of fluids and eating foods such as fruits and vegetables that are high in fiber can also help. Remember, do not take laxatives unless your surgeon has prescribed them.  · Drinking alcohol and taking pain medicine can cause dizziness and slow your breathing. It can even be deadly. Do not drink alcohol while taking pain medicine.  · Pain medicine can make you react more slowly to things. Do not drive or run machinery while taking pain medicine.  Your health care provider may tell you to take acetaminophen to help ease your pain. Ask him or her how much you are supposed to take each day. Acetaminophen or other pain relievers may interact with your prescription medicines or other over-the-counter (OTC) drugs. Some prescription medicines have acetaminophen and other ingredients. Using both prescription and OTC acetaminophen for pain can cause you to overdose. Read the labels on your OTC medicines with care. This will help you to clearly know the list of ingredients, how much to take, and  any warnings. It may also help you not take too much acetaminophen. If you have questions or do not understand the information, ask your pharmacist or health care provider to explain it to you before you take the OTC medicine.  Managing nausea  Some people have an upset stomach after surgery. This is often because of anesthesia, pain, or pain medicine, or the stress of surgery. These tips will help you handle nausea and eat healthy foods as you get better. If you were on a special food plan before surgery, ask your doctor if you should follow it while you get better. These tips may help:  · Do not push yourself to eat. Your body will tell you when to eat and how much.  · Start off with clear liquids and soup. They are easier to digest.  · Next try semi-solid foods, such as mashed potatoes, applesauce, and gelatin, as you feel ready.  · Slowly move to solid foods. Dont eat fatty, rich, or spicy foods at first.  · Do not force yourself to have 3 large meals a day. Instead eat smaller amounts more often.  · Take pain medicines with a small amount of solid food, such as crackers or toast, to avoid nausea.     Call your surgeon if  · You still have pain an hour after taking medicine. The medicine may not be strong enough.  · You feel too sleepy, dizzy, or groggy. The medicine may be too strong.  · You have side effects like nausea, vomiting, or skin changes, such as rash, itching, or hives.       If you have obstructive sleep apnea  You were given anesthesia medicine during surgery to keep you comfortable and free of pain. After surgery, you may have more apnea spells because of this medicine and other medicines you were given. The spells may last longer than usual.   At home:  · Keep using the continuous positive airway pressure (CPAP) device when you sleep. Unless your health care provider tells you not to, use it when you sleep, day or night. CPAP is a common device used to treat obstructive sleep apnea.  · Talk  with your provider before taking any pain medicine, muscle relaxants, or sedatives. Your provider will tell you about the possible dangers of taking these medicines.  © 8609-4843 [employer]. [address] [place] [address], [place] [address]. All rights reserved. This information is not intended as a substitute for professional medical care. Always follow your healthcare professional's instructions.    Post op instructions for prevention of DVT  What is deep vein thrombosis?  Deep vein thrombosis (DVT) is the medical term for blood clots in the deep veins of the leg.  These blood clots can be dangerous.  A DVT can block a blood vessel and keep blood from getting where it needs to go.  Another problem is that the clot can travel to other parts of the body such as the lungs.  A clot that travels to the lungs is called a pulmonary embolus (PE) and can cause serious problems with breathing which can lead to death.  Am I at risk for DVT/PE?  If you are not very active, you are at risk of DVT.  Anyone confined to bed, sitting for long periods of time, recovering from surgery, etc. increases the risk of DVT.  Other risk factors are cancer diagnosis, certain medications, estrogen replacement in any form,older age, obesity, pregnancy, smoking, history of clotting disorders, and dehydration.  How will I know if I have a DVT?   Swelling in the lower leg   Pain   Warmth, redness, hardness or bulging of the vein  If you have any of these symptoms, call your doctors office right away.  Some people will not have any symptoms until the clot moves to the lungs.  What are the symptoms of a PE?   Panting, shortness of breath, or trouble breathing   Sharp, knife-like chest pain when you breathe   Coughing or coughing up blood   Rapid heartbeat  If you have any of these symptoms or get worse quickly, call 911 for emergency treatment.  How can I prevent a DVT?   Avoid long periods of inactivity and dont cross your  legs--get up and walk around every hour or so.   Stay active--walking after surgery is highly encouraged.  This means you should get out of the house and walk in the neighborhood.  Going up and down stairs will not impair healing (unless advised against such activity by your doctor).     Drink plenty of noncaffeinated, nonalcoholic fluids each day to prevent dehydration.   Wear special support stockings, if they have been advised by your doctor.   If you travel, stop at least once an hour and walk around.   Avoid smoking (assistance with stopping is available through your healthcare provider)  Always notify your doctor if you are not able to follow the post operative instructions that are given to you at the time of discharge.  It may be necessary to prescribe one of the medications available to prevent DVT.    General Information:    1.  Do not drink alcoholic beverages including beer for 24 hours or as long as you are on pain medication..  2.  Do not drive a motor vehicle, operate machinery or power tools, or signs legal papers for 24 hours or as long as you are on pain medication.   3.  You may experience light-headedness, dizziness, and sleepiness following surgery. Please do not stay alone. A responsible adult should be with you for this 24 hour period.  4.  Go home and rest.  5. Progress slowly to a normal diet unless instructed.  Otherwise, begin with liquids such as soft drinks, then soup and crackers working up to solid foods. Drink plenty of nonalcoholic fluids.  6.  Certain anesthetics and pain medications produce nausea and vomiting in certain individuals. If nausea becomes a problem at home, call you doctor.  7. A nurse will be calling you sometime after surgery. Do not be alarmed. This is our way of finding out how you are doing.  8. Several times every hour while you are awake, take 2-3 deep breaths and cough. If you had stomach surgery hold a pillow or rolled towel firmly against your stomach  before you cough. This will help with any pain the cough might cause.  9. Several times every hour while you are awake, pump and flex your feet 5-6 times and do foot circles. This will help prevent blood clots.  10.Call your doctor for severe pain, bleeding, fever, or signs or symptoms of infection (pain, swelling, redness, foul odor, drainage).    We hope your stay was comfortable as you heal now, mend and rest.    For we have enjoyed taking care of you by giving your our best.    And as you get better, by regaining your health and strength;   We count it as a privilege to have served you and hope your time at [facility] was well spent.      Thank  You!!!

## 2020-12-03 NOTE — ANESTHESIA POSTPROCEDURE EVALUATION
Anesthesia Post Evaluation    Patient: Domenico Ford    Procedure(s) Performed: Procedure(s) (LRB):  CHOLECYSTECTOMY, LAPAROSCOPIC (N/A)    Final Anesthesia Type: general    Patient location during evaluation: PACU  Patient participation: Yes- Able to Participate  Level of consciousness: awake and alert and oriented  Post-procedure vital signs: reviewed and stable  Pain management: adequate  Airway patency: patent    PONV status at discharge: No PONV  Anesthetic complications: no      Cardiovascular status: blood pressure returned to baseline  Respiratory status: unassisted, spontaneous ventilation and room air  Hydration status: euvolemic  Follow-up not needed.          Vitals Value Taken Time   /99 12/03/20 1107   Temp  12/03/20 1110   Pulse 66 12/03/20 1109   Resp 21 12/03/20 1109   SpO2 92 % 12/03/20 1109   Vitals shown include unvalidated device data.      No case tracking events are documented in the log.      Pain/Anastasiya Score: No data recorded

## 2020-12-03 NOTE — PLAN OF CARE
Released from Pacu when criteria met pain controlled skin w+d No nausea No emesis dsg dry intact aaox4 encouraged deep breaths Pt has all belongings  steri strips x 3 belly dry intact  DUE to VOID

## 2020-12-03 NOTE — OP NOTE
PATIENT NAME:  Domenico Ford     DATE OF PROCEDURE: 12/3/2020    PROCEDURE: Laparoscopic Cholecystectomy    PREOPERATIVE DIAGNOSIS: Cholelithiasis / Cholecystitis   POSTOPERATIVE DIAGNOSIS: Cholelithiasis / Cholecystitis     SURGEON: Reed Villavicencio Jr., M.D.  ASSISTANT: Yue Estes     DESCRIPTION OF PROCEDURE: After appropriate consent was obtained, consent forms   signed and questions answered, the patient was taken to the Operating Room and   placed on the operating room table where general endotracheal anesthesia was   induced without difficulty. Preoperative antibiotics were administered. SCDs were in   place. A Timeout procedure was performed. The abdomen was prepped and draped in the usual sterile fashion. A midline incision was made beneath the umbilicus. Dissection was performed down to the fascia, which was incised. Stay sutures were placed on the fascia and the Christian trocar was inserted. The abdomen was insufflated. Under direct   vision and after injection with local anesthetic, a 5 mm trocar was placed in   the upper midline. A second 5 mm trocar was placed between these two. The   gallbladder was identified, grasped, and retracted. There was some mild to moderate  inflammation. The cystic duct was identified and carefully dissected. Three clips   were placed on the common duct side, 2 on the gallbladder side, and the duct was  transected. The artery was identified, dissected, clipped and cut as well. The gallbladder was then dissected free of the liver bed with electrocautery. It was placed in a retrieval bag and removed through the umbilical port site.. The gallbladder fossa was examined and found to be hemostatic. The trocars were then removed under direct vision.The abdomen was desufflated. The fascia at the umbilicus was closed with interrupted 0 Vicryl suture and additional local was injected. The skin was then closed with 4-0 Monocryl subcuticular stitches. Steri-Strips were then applied  to all incisions. The patient was awakened, extubated and transferred to the Recovery Room in good condition. The patient tolerated the procedure without complication. Lap and instrument counts were reported as correct at the termination of the procedure.    EBL: 15 cc  SPECIMEN: gallbladder  COMPLICATIONS: none  ANESTHESIA: General

## 2020-12-03 NOTE — TRANSFER OF CARE
"Anesthesia Transfer of Care Note    Patient: Domenico Ford    Procedure(s) Performed: Procedure(s) (LRB):  CHOLECYSTECTOMY, LAPAROSCOPIC (N/A)    Patient location: PACU    Anesthesia Type: general    Transport from OR: Transported from OR on 2-3 L/min O2 by NC with adequate spontaneous ventilation    Post pain: adequate analgesia    Post assessment: no apparent anesthetic complications and tolerated procedure well    Post vital signs: stable    Level of consciousness: responds to stimulation and sedated    Nausea/Vomiting: no nausea/vomiting    Complications: none    Transfer of care protocol was followed      Last vitals:   Visit Vitals  BP (!) 140/82 (BP Location: Left arm, Patient Position: Lying)   Pulse 74   Temp 36.4 °C (97.5 °F) (Temporal)   Resp 18   Ht 5' 11" (1.803 m)   Wt 85.7 kg (189 lb)   SpO2 97%   BMI 26.36 kg/m²     "

## 2020-12-03 NOTE — PLAN OF CARE
1245 discharge instructions were given to th ept and his wife  pain level 2/10  Denies nausea    Handouts were given to the pt and his wife on the discharge instructions to refer to at home    All questions were answered and concerns addressed

## 2020-12-04 VITALS
HEIGHT: 71 IN | RESPIRATION RATE: 16 BRPM | TEMPERATURE: 98 F | HEART RATE: 72 BPM | BODY MASS INDEX: 26.46 KG/M2 | DIASTOLIC BLOOD PRESSURE: 82 MMHG | WEIGHT: 189 LBS | SYSTOLIC BLOOD PRESSURE: 156 MMHG | OXYGEN SATURATION: 96 %

## 2020-12-08 LAB
FINAL PATHOLOGIC DIAGNOSIS: NORMAL
GROSS: NORMAL
Lab: NORMAL

## 2020-12-15 ENCOUNTER — OFFICE VISIT (OUTPATIENT)
Dept: SURGERY | Facility: CLINIC | Age: 55
End: 2020-12-15
Payer: COMMERCIAL

## 2020-12-15 VITALS — BODY MASS INDEX: 26.84 KG/M2 | WEIGHT: 192.44 LBS

## 2020-12-15 DIAGNOSIS — Z98.890 POST-OPERATIVE STATE: Primary | ICD-10-CM

## 2020-12-15 PROCEDURE — 1126F PR PAIN SEVERITY QUANTIFIED, NO PAIN PRESENT: ICD-10-PCS | Mod: S$GLB,,, | Performed by: SURGERY

## 2020-12-15 PROCEDURE — 1126F AMNT PAIN NOTED NONE PRSNT: CPT | Mod: S$GLB,,, | Performed by: SURGERY

## 2020-12-15 PROCEDURE — 99999 PR PBB SHADOW E&M-EST. PATIENT-LVL III: ICD-10-PCS | Mod: PBBFAC,,, | Performed by: SURGERY

## 2020-12-15 PROCEDURE — 99999 PR PBB SHADOW E&M-EST. PATIENT-LVL III: CPT | Mod: PBBFAC,,, | Performed by: SURGERY

## 2020-12-15 PROCEDURE — 3008F PR BODY MASS INDEX (BMI) DOCUMENTED: ICD-10-PCS | Mod: CPTII,S$GLB,, | Performed by: SURGERY

## 2020-12-15 PROCEDURE — 3008F BODY MASS INDEX DOCD: CPT | Mod: CPTII,S$GLB,, | Performed by: SURGERY

## 2020-12-15 PROCEDURE — 99024 POSTOP FOLLOW-UP VISIT: CPT | Mod: S$GLB,,, | Performed by: SURGERY

## 2020-12-15 PROCEDURE — 99024 PR POST-OP FOLLOW-UP VISIT: ICD-10-PCS | Mod: S$GLB,,, | Performed by: SURGERY

## 2020-12-30 NOTE — PROGRESS NOTES
POST-OP NOTE    PROCEDURE: Lap cholecystectomy    COMPLAINTS: None.    EXAM: Incision well approximated. No drainage. No infection.      IMPRESSION: Doing well.  Component 3wk ago   Final Pathologic Diagnosis Gallbladder (cholecystectomy):   - Acute and chronic cholecystitis with cholelithiasis          PLAN: FU as needed.

## 2021-06-23 ENCOUNTER — PATIENT OUTREACH (OUTPATIENT)
Dept: ADMINISTRATIVE | Facility: HOSPITAL | Age: 56
End: 2021-06-23

## 2021-06-23 ENCOUNTER — OFFICE VISIT (OUTPATIENT)
Dept: FAMILY MEDICINE | Facility: CLINIC | Age: 56
End: 2021-06-23
Payer: COMMERCIAL

## 2021-06-23 VITALS
HEIGHT: 71 IN | RESPIRATION RATE: 18 BRPM | OXYGEN SATURATION: 95 % | BODY MASS INDEX: 26.45 KG/M2 | HEART RATE: 78 BPM | SYSTOLIC BLOOD PRESSURE: 128 MMHG | WEIGHT: 188.94 LBS | DIASTOLIC BLOOD PRESSURE: 78 MMHG

## 2021-06-23 DIAGNOSIS — I10 BENIGN ESSENTIAL HTN: ICD-10-CM

## 2021-06-23 DIAGNOSIS — Z86.73 HISTORY OF STROKE: ICD-10-CM

## 2021-06-23 DIAGNOSIS — D75.1 POLYCYTHEMIA: ICD-10-CM

## 2021-06-23 DIAGNOSIS — Z00.00 ROUTINE GENERAL MEDICAL EXAMINATION AT A HEALTH CARE FACILITY: Primary | ICD-10-CM

## 2021-06-23 DIAGNOSIS — E29.1 HYPOGONADISM IN MALE: ICD-10-CM

## 2021-06-23 DIAGNOSIS — Z12.11 COLON CANCER SCREENING: ICD-10-CM

## 2021-06-23 PROCEDURE — 99214 OFFICE O/P EST MOD 30 MIN: CPT | Mod: S$GLB,,, | Performed by: STUDENT IN AN ORGANIZED HEALTH CARE EDUCATION/TRAINING PROGRAM

## 2021-06-23 PROCEDURE — 99999 PR PBB SHADOW E&M-EST. PATIENT-LVL IV: ICD-10-PCS | Mod: PBBFAC,,, | Performed by: STUDENT IN AN ORGANIZED HEALTH CARE EDUCATION/TRAINING PROGRAM

## 2021-06-23 PROCEDURE — 3008F PR BODY MASS INDEX (BMI) DOCUMENTED: ICD-10-PCS | Mod: CPTII,S$GLB,, | Performed by: STUDENT IN AN ORGANIZED HEALTH CARE EDUCATION/TRAINING PROGRAM

## 2021-06-23 PROCEDURE — 99214 PR OFFICE/OUTPT VISIT, EST, LEVL IV, 30-39 MIN: ICD-10-PCS | Mod: S$GLB,,, | Performed by: STUDENT IN AN ORGANIZED HEALTH CARE EDUCATION/TRAINING PROGRAM

## 2021-06-23 PROCEDURE — 99999 PR PBB SHADOW E&M-EST. PATIENT-LVL IV: CPT | Mod: PBBFAC,,, | Performed by: STUDENT IN AN ORGANIZED HEALTH CARE EDUCATION/TRAINING PROGRAM

## 2021-06-23 PROCEDURE — 1126F AMNT PAIN NOTED NONE PRSNT: CPT | Mod: S$GLB,,, | Performed by: STUDENT IN AN ORGANIZED HEALTH CARE EDUCATION/TRAINING PROGRAM

## 2021-06-23 PROCEDURE — 3008F BODY MASS INDEX DOCD: CPT | Mod: CPTII,S$GLB,, | Performed by: STUDENT IN AN ORGANIZED HEALTH CARE EDUCATION/TRAINING PROGRAM

## 2021-06-23 PROCEDURE — 1126F PR PAIN SEVERITY QUANTIFIED, NO PAIN PRESENT: ICD-10-PCS | Mod: S$GLB,,, | Performed by: STUDENT IN AN ORGANIZED HEALTH CARE EDUCATION/TRAINING PROGRAM

## 2021-06-24 ENCOUNTER — PATIENT OUTREACH (OUTPATIENT)
Dept: ADMINISTRATIVE | Facility: HOSPITAL | Age: 56
End: 2021-06-24

## 2021-08-10 RX ORDER — ATORVASTATIN CALCIUM 10 MG/1
10 TABLET, FILM COATED ORAL NIGHTLY
Qty: 90 TABLET | Refills: 1 | Status: SHIPPED | OUTPATIENT
Start: 2021-08-10 | End: 2022-02-03

## 2021-08-12 RX ORDER — POTASSIUM CHLORIDE 750 MG/1
10 TABLET, EXTENDED RELEASE ORAL DAILY PRN
Qty: 90 TABLET | Refills: 1 | Status: SHIPPED | OUTPATIENT
Start: 2021-08-12

## 2021-08-12 RX ORDER — LISINOPRIL 20 MG/1
TABLET ORAL
Qty: 90 TABLET | Refills: 1 | Status: SHIPPED | OUTPATIENT
Start: 2021-08-12 | End: 2022-05-13

## 2021-08-12 RX ORDER — METOPROLOL SUCCINATE 50 MG/1
50 TABLET, EXTENDED RELEASE ORAL NIGHTLY
Qty: 90 TABLET | Refills: 1 | Status: SHIPPED | OUTPATIENT
Start: 2021-08-12 | End: 2022-03-08

## 2021-12-23 ENCOUNTER — OFFICE VISIT (OUTPATIENT)
Dept: FAMILY MEDICINE | Facility: CLINIC | Age: 56
End: 2021-12-23
Payer: COMMERCIAL

## 2021-12-23 ENCOUNTER — TELEPHONE (OUTPATIENT)
Dept: FAMILY MEDICINE | Facility: CLINIC | Age: 56
End: 2021-12-23
Payer: COMMERCIAL

## 2021-12-23 VITALS
BODY MASS INDEX: 26.42 KG/M2 | HEART RATE: 68 BPM | RESPIRATION RATE: 18 BRPM | SYSTOLIC BLOOD PRESSURE: 124 MMHG | WEIGHT: 188.69 LBS | DIASTOLIC BLOOD PRESSURE: 82 MMHG | OXYGEN SATURATION: 96 % | HEIGHT: 71 IN

## 2021-12-23 DIAGNOSIS — Z79.890 LONG-TERM CURRENT USE OF TESTOSTERONE REPLACEMENT THERAPY: ICD-10-CM

## 2021-12-23 DIAGNOSIS — D75.1 POLYCYTHEMIA: ICD-10-CM

## 2021-12-23 DIAGNOSIS — Z23 NEED FOR INFLUENZA VACCINATION: Primary | ICD-10-CM

## 2021-12-23 DIAGNOSIS — D75.1 SECONDARY POLYCYTHEMIA: ICD-10-CM

## 2021-12-23 PROCEDURE — 3008F PR BODY MASS INDEX (BMI) DOCUMENTED: ICD-10-PCS | Mod: CPTII,S$GLB,, | Performed by: STUDENT IN AN ORGANIZED HEALTH CARE EDUCATION/TRAINING PROGRAM

## 2021-12-23 PROCEDURE — 1159F MED LIST DOCD IN RCRD: CPT | Mod: CPTII,S$GLB,, | Performed by: STUDENT IN AN ORGANIZED HEALTH CARE EDUCATION/TRAINING PROGRAM

## 2021-12-23 PROCEDURE — 90686 FLU VACCINE (QUAD) GREATER THAN OR EQUAL TO 3YO PRESERVATIVE FREE IM: ICD-10-PCS | Mod: S$GLB,,, | Performed by: STUDENT IN AN ORGANIZED HEALTH CARE EDUCATION/TRAINING PROGRAM

## 2021-12-23 PROCEDURE — 1159F PR MEDICATION LIST DOCUMENTED IN MEDICAL RECORD: ICD-10-PCS | Mod: CPTII,S$GLB,, | Performed by: STUDENT IN AN ORGANIZED HEALTH CARE EDUCATION/TRAINING PROGRAM

## 2021-12-23 PROCEDURE — 99213 OFFICE O/P EST LOW 20 MIN: CPT | Mod: 25,S$GLB,, | Performed by: STUDENT IN AN ORGANIZED HEALTH CARE EDUCATION/TRAINING PROGRAM

## 2021-12-23 PROCEDURE — 90471 FLU VACCINE (QUAD) GREATER THAN OR EQUAL TO 3YO PRESERVATIVE FREE IM: ICD-10-PCS | Mod: S$GLB,,, | Performed by: STUDENT IN AN ORGANIZED HEALTH CARE EDUCATION/TRAINING PROGRAM

## 2021-12-23 PROCEDURE — 1160F RVW MEDS BY RX/DR IN RCRD: CPT | Mod: CPTII,S$GLB,, | Performed by: STUDENT IN AN ORGANIZED HEALTH CARE EDUCATION/TRAINING PROGRAM

## 2021-12-23 PROCEDURE — 3079F DIAST BP 80-89 MM HG: CPT | Mod: CPTII,S$GLB,, | Performed by: STUDENT IN AN ORGANIZED HEALTH CARE EDUCATION/TRAINING PROGRAM

## 2021-12-23 PROCEDURE — 3008F BODY MASS INDEX DOCD: CPT | Mod: CPTII,S$GLB,, | Performed by: STUDENT IN AN ORGANIZED HEALTH CARE EDUCATION/TRAINING PROGRAM

## 2021-12-23 PROCEDURE — 3074F PR MOST RECENT SYSTOLIC BLOOD PRESSURE < 130 MM HG: ICD-10-PCS | Mod: CPTII,S$GLB,, | Performed by: STUDENT IN AN ORGANIZED HEALTH CARE EDUCATION/TRAINING PROGRAM

## 2021-12-23 PROCEDURE — 99213 PR OFFICE/OUTPT VISIT, EST, LEVL III, 20-29 MIN: ICD-10-PCS | Mod: 25,S$GLB,, | Performed by: STUDENT IN AN ORGANIZED HEALTH CARE EDUCATION/TRAINING PROGRAM

## 2021-12-23 PROCEDURE — 90471 IMMUNIZATION ADMIN: CPT | Mod: S$GLB,,, | Performed by: STUDENT IN AN ORGANIZED HEALTH CARE EDUCATION/TRAINING PROGRAM

## 2021-12-23 PROCEDURE — 90686 IIV4 VACC NO PRSV 0.5 ML IM: CPT | Mod: S$GLB,,, | Performed by: STUDENT IN AN ORGANIZED HEALTH CARE EDUCATION/TRAINING PROGRAM

## 2021-12-23 PROCEDURE — 1160F PR REVIEW ALL MEDS BY PRESCRIBER/CLIN PHARMACIST DOCUMENTED: ICD-10-PCS | Mod: CPTII,S$GLB,, | Performed by: STUDENT IN AN ORGANIZED HEALTH CARE EDUCATION/TRAINING PROGRAM

## 2021-12-23 PROCEDURE — 3079F PR MOST RECENT DIASTOLIC BLOOD PRESSURE 80-89 MM HG: ICD-10-PCS | Mod: CPTII,S$GLB,, | Performed by: STUDENT IN AN ORGANIZED HEALTH CARE EDUCATION/TRAINING PROGRAM

## 2021-12-23 PROCEDURE — 3074F SYST BP LT 130 MM HG: CPT | Mod: CPTII,S$GLB,, | Performed by: STUDENT IN AN ORGANIZED HEALTH CARE EDUCATION/TRAINING PROGRAM

## 2021-12-23 PROCEDURE — 99999 PR PBB SHADOW E&M-EST. PATIENT-LVL IV: CPT | Mod: PBBFAC,,, | Performed by: STUDENT IN AN ORGANIZED HEALTH CARE EDUCATION/TRAINING PROGRAM

## 2021-12-23 PROCEDURE — 99999 PR PBB SHADOW E&M-EST. PATIENT-LVL IV: ICD-10-PCS | Mod: PBBFAC,,, | Performed by: STUDENT IN AN ORGANIZED HEALTH CARE EDUCATION/TRAINING PROGRAM

## 2021-12-23 NOTE — PROGRESS NOTES
[facility] MEDICINE CLINIC NOTE    Patient Name: Domenico Ford  YOB: 1965    PRESENTING HISTORY   Chief Complaint:   Chief Complaint   Patient presents with    Follow-up        History of Present Illness:  Mr. Domenico Ford is a 56 y.o. male here for f/u.     HTN is well controlled.     Gets blood drawn by a provider who is managing his TRT in [place]. The clinic is [facility].  This was done in November. Again he is polycythemic with HCT of 55   He has been unable to donate blood since he was given a prescription for phlebotomy at some point and has not been prescribed phlebotomy.   Again I am concerned regarding thrombotic risk.                                          Review of Systems   All other systems reviewed and are negative.        PAST HISTORY:     Past Medical History:   Diagnosis Date    Hypertension     Stroke 2017    HEMMORGIC    Wears glasses        Past Surgical History:   Procedure Laterality Date    HERNIA REPAIR      [facility]    LAPAROSCOPIC CHOLECYSTECTOMY N/A 12/3/2020    Procedure: CHOLECYSTECTOMY, LAPAROSCOPIC;  Surgeon: Reed Moulton MD;  Location: [facility];  Service: General;  Laterality: N/A;       History reviewed. No pertinent family history.    Social History     Socioeconomic History    Marital status:    Tobacco Use    Smoking status: Never Smoker    Smokeless tobacco: Never Used   Substance and Sexual Activity    Alcohol use: Yes     Comment: OCC    Drug use: Never    Sexual activity: Yes     Partners: Female       MEDICATIONS & ALLERGIES:     Current Outpatient Medications on File Prior to Visit   Medication Sig    atorvastatin (LIPITOR) 10 MG tablet Take 1 tablet (10 mg total) by mouth every evening.    lisinopriL (PRINIVIL,ZESTRIL) 20 MG tablet lisinopril 20 mg tablet  TK 1 T PO QAM    metoprolol succinate (TOPROL-XL) 50 MG 24 hr tablet Take 1 tablet (50 mg total) by mouth every evening.    multivitamin capsule Take 1 capsule by mouth  "once daily.    potassium chloride (KLOR-CON) 10 MEQ TbSR Take 1 tablet (10 mEq total) by mouth daily as needed.    testosterone cypionate (DEPOTESTOTERONE CYPIONATE) 100 mg/mL injection Inject 100 mg into the muscle every 7 days.    calcium carbonate/vitamin D3 (VITAMIN D-3 ORAL) Take by mouth once daily.     No current facility-administered medications on file prior to visit.       Review of patient's allergies indicates:   Allergen Reactions    Hydrocodone-acetaminophen Other (See Comments)     SHAKING, DECREASED MOTOR SKILLS    Oxycodone-acetaminophen     Hydrocodone-ibuprofen Nausea And Vomiting       OBJECTIVE:   Vital Signs:  Vitals:    12/23/21 0800   BP: 124/82   Pulse: 68   Resp: 18   SpO2: 96%   Weight: 85.6 kg (188 lb 11.4 oz)   Height: 5' 11" (1.803 m)       No results found for this or any previous visit (from the past 24 hour(s)).      Physical Exam  Vitals and nursing note reviewed.   Constitutional:       General: He is not in acute distress.     Appearance: He is not toxic-appearing or diaphoretic.   HENT:      Head: Normocephalic and atraumatic.      Right Ear: External ear normal.      Left Ear: External ear normal.   Eyes:      General: No scleral icterus.     Extraocular Movements: EOM normal.      Conjunctiva/sclera: Conjunctivae normal.      Pupils: Pupils are equal, round, and reactive to light.   Neck:      Thyroid: No thyromegaly.      Vascular: No carotid bruit.   Cardiovascular:      Rate and Rhythm: Normal rate and regular rhythm.      Heart sounds: Normal heart sounds. No murmur heard.      Pulmonary:      Effort: Pulmonary effort is normal. No respiratory distress.      Breath sounds: Normal breath sounds. No wheezing or rales.   Musculoskeletal:         General: No tenderness, deformity or edema. Normal range of motion.      Cervical back: Normal range of motion and neck supple.      Right lower leg: No edema.      Left lower leg: No edema.   Lymphadenopathy:      Cervical: No " cervical adenopathy.   Skin:     General: Skin is warm and dry.      Findings: No erythema or rash.   Neurological:      Mental Status: He is alert and oriented to person, place, and time.      GCS: GCS score is 15.      Gait: Gait is intact.   Psychiatric:         Mood and Affect: Mood and affect normal.         Cognition and Memory: Memory normal.         Judgment: Judgment normal.         ASSESSMENT & PLAN:     Need for influenza vaccination  -     Cancel: Influenza (FLUAD) - Quadrivalent (Adjuvanted) *Preferred* (65+) (PF)  -     Influenza - Quadrivalent *Preferred* (6 months+) (PF)    Polycythemia  -     Cancel: CBC Auto Differential; Future; Expected date: 12/23/2021  -     Cancel: CBC Auto Differential; Standing    Secondary polycythemia  -     Misc Sendout Test, Blood Phlebotomy, every 6 months as directed; Future; Expected date: 12/23/2021  -     Misc Sendout Test, Blood Blood phlebotomy for treatment of secondary polycythemia due to tesosterone replacement therapy; Future; Expected date: 12/23/2021    Long-term current use of testosterone replacement therapy  -     Misc Sendout Test, Blood Phlebotomy, every 6 months as directed; Future; Expected date: 12/23/2021  -     Misc Sendout Test, Blood Blood phlebotomy for treatment of secondary polycythemia due to tesosterone replacement therapy; Future; Expected date: 12/23/2021      I would recommend reducing dose of testosterone. Barring that I think phlebotomy may lower his risk of thrombosis so I will prescribe this.      Luca Alberts MD   Internal Medicine    This note was created using Dragon voice recognition software that occasionally misinterprets phrases or words.

## 2022-01-08 ENCOUNTER — OCCUPATIONAL HEALTH (OUTPATIENT)
Dept: URGENT CARE | Facility: CLINIC | Age: 57
End: 2022-01-08

## 2022-01-08 PROCEDURE — 80305 DRUG TEST PRSMV DIR OPT OBS: CPT | Mod: S$GLB,,, | Performed by: EMERGENCY MEDICINE

## 2022-01-08 PROCEDURE — 80305 PR COLLECTION ONLY DRUG SCREEN: ICD-10-PCS | Mod: S$GLB,,, | Performed by: EMERGENCY MEDICINE

## 2022-02-03 RX ORDER — ATORVASTATIN CALCIUM 10 MG/1
TABLET, FILM COATED ORAL
Qty: 90 TABLET | Refills: 1 | Status: SHIPPED | OUTPATIENT
Start: 2022-02-03

## 2022-02-03 NOTE — TELEPHONE ENCOUNTER
Care Due:                  Date            Visit Type   Department     Provider  --------------------------------------------------------------------------------                                EP -                              PRIMARY      WHIT Leahy  Last Visit: 12-      CARE ([facility])   MEDICINE       Naccari                              EP -                              PRIMARY      [facility]    Luca Leahy  Next Visit: 12-      CARE ([facility])   MEDICINE       Naccari                                                            Last  Test          Frequency    Reason                     Performed    Due Date  --------------------------------------------------------------------------------    CMP.........  12 months..  atorvastatin, lisinopriL,   Not Found    Overdue                             potassium................    Lipid Panel.  12 months..  atorvastatin.............  Not Found    Overdue    Powered by [employer] "[employer]. Reference number: [id number].   2/03/2022 5:51:21 AM CST

## 2022-03-02 NOTE — TELEPHONE ENCOUNTER
Care Due:                  Date            Visit Type   Department     Provider  --------------------------------------------------------------------------------                                EP -                              PRIMARY      [facility]    Luca Leahy  Last Visit: 12-      CARE ([facility])   MEDICINE       Naccari                              EP -                              PRIMARY      [facility]    Luca Leahy  Next Visit: 12-      CARE ([facility])   MEDICINE       Naccari                                                            Last  Test          Frequency    Reason                     Performed    Due Date  --------------------------------------------------------------------------------    CMP.........  12 months..  atorvastatin, lisinopriL,   11- 11-                             potassium................    Powered by [employer]. Reference number: [id number].   3/01/2022 8:40:47 PM CST

## 2022-03-08 RX ORDER — METOPROLOL SUCCINATE 50 MG/1
50 TABLET, EXTENDED RELEASE ORAL NIGHTLY
Qty: 90 TABLET | Refills: 3 | Status: SHIPPED | OUTPATIENT
Start: 2022-03-08 | End: 2023-03-16

## 2022-03-08 NOTE — TELEPHONE ENCOUNTER
Provider Staff:     Action is required for this patient.   Please see care gap opportunities below in Care Due Message: Labs(CMP) .     Thanks!  [facility]     Appointments      Date Provider   Last Visit   12/23/2021 Luca Alberts MD   Next Visit   Visit date not found Luca Alberts MD     Note composed:4:38 PM 03/08/2022

## 2022-03-08 NOTE — TELEPHONE ENCOUNTER
Refill Authorization Note   Domenico Ford  is requesting a refill authorization.  Brief Assessment and Rationale for Refill:  Approve    -Medication-Related Problems Identified: Requires labs  Medication Therapy Plan:  Labs(CMP)    Medication Reconciliation Completed: No   Comments:   --->Care Gap information included below if applicable.   Orders Placed This Encounter    metoprolol succinate (TOPROL-XL) 50 MG 24 hr tablet      Requested Prescriptions   Signed Prescriptions Disp Refills    metoprolol succinate (TOPROL-XL) 50 MG 24 hr tablet 90 tablet 3     Sig: Take 1 tablet (50 mg total) by mouth every evening.       Cardiovascular:  Beta Blockers Passed - 3/8/2022  4:35 PM        Passed - Patient is at least 18 years old        Passed - Last BP in normal range within 360 days     BP Readings from Last 1 Encounters:   12/23/21 124/82               Passed - Last Heart Rate in normal range within 360 days     Pulse Readings from Last 1 Encounters:   12/23/21 68              Passed - Valid encounter within last 15 months     Recent Visits  Date Type Provider Dept   12/23/21 Office Visit Luca Alberts MD [facility]   06/23/21 Office Visit Luca Alberts MD [facility]   Showing recent visits within past 720 days and meeting all other requirements  Future Appointments  No visits were found meeting these conditions.  Showing future appointments within next 150 days and meeting all other requirements      Future Appointments              In 9 months Luca Alberts MD [facility] [place]                    Appointments  past 12m or future 3m with PCP    Date Provider   Last Visit   12/23/2021 Luca Alberts MD   Next Visit   Visit date not found Luca Alberts MD   ED visits in past 90 days: 0     Note composed:4:38 PM 03/08/2022          Subjective:       Patient ID: Sheree Pugh is a 89 y.o. female    Chief Complaint: Hypertension    HPI  Here today for followup of diverticulitis with rectal bleeding.  Bleeding resolved.  She is now having fatigue and hypotension.  She has persistent pain on the left leg with associated edema.    Review of Systems     Objective:   Physical Exam   Constitutional: She is oriented to person, place, and time. She appears well-developed and well-nourished.   HENT:   Head: Normocephalic and atraumatic.   Eyes: Conjunctivae and EOM are normal. Pupils are equal, round, and reactive to light. No scleral icterus.   Neck: Normal range of motion. Neck supple. No thyromegaly present.   Cardiovascular: Normal rate, regular rhythm and normal heart sounds. Exam reveals no gallop and no friction rub.   No murmur heard.  Pulmonary/Chest: Effort normal and breath sounds normal. No respiratory distress. She has no wheezes. She has no rales.   Musculoskeletal: She exhibits edema (1-2+).   Lymphadenopathy:     She has no cervical adenopathy.   Neurological: She is alert and oriented to person, place, and time.   Vitals reviewed.       Assessment:       1. Diverticulitis  CBC auto differential    Comprehensive metabolic panel   2. Sciatica of right side  nortriptyline (PAMELOR) 10 MG capsule   3. Edema, unspecified type     4. Exudative age-related macular degeneration of left eye, unspecified stage          Plan:       Diverticulitis  -     CBC auto differential; Future; Expected date: 11/02/2018  -     Comprehensive metabolic panel; Future; Expected date: 11/02/2018    Sciatica of right side  -     nortriptyline (PAMELOR) 10 MG capsule; Take 1 capsule (10 mg total) by mouth every evening.  Dispense: 90 capsule; Refill: 3    Edema, unspecified type  - Compression stockings    Exudative age-related macular degeneration of left eye, unspecified stage  - Continue Ophthalmology    Due to hypotension, reduce Monopril to 10mg daily  Serial  blood pressure monitoring

## 2022-05-13 RX ORDER — LISINOPRIL 20 MG/1
TABLET ORAL
Qty: 90 TABLET | Refills: 1 | Status: SHIPPED | OUTPATIENT
Start: 2022-05-13

## 2022-05-13 NOTE — TELEPHONE ENCOUNTER
Refill Routing Note   Medication(s) are not appropriate for processing by [facility] for the following reason(s):      - Required laboratory values are outdated    [facility] action(s):  Defer Medication-related problems identified: Requires labs        Medication reconciliation completed: No     Appointments  past 12m or future 3m with PCP    Date Provider   Last Visit   12/23/2021 Luca Alberts MD   Next Visit   Visit date not found Luca Alberts MD   ED visits in past 90 days: 0        Note composed:9:02 PM 05/12/2022

## 2022-05-13 NOTE — TELEPHONE ENCOUNTER
Care Due:                  Date            Visit Type   Department     Provider  --------------------------------------------------------------------------------                                EP -                              PRIMARY      WHIT Leahy  Last Visit: 12-      CARE ([facility])   MEDICINE       Naccari                              EP -                              PRIMARY      [facility]    Luca Leahy  Next Visit: 12-      CARE ([facility])   MEDICINE       Naccari                                                            Last  Test          Frequency    Reason                     Performed    Due Date  --------------------------------------------------------------------------------    CMP.........  12 months..  atorvastatin, lisinopriL,   11- 11-                             potassium................    Lipid Panel.  12 months..  atorvastatin.............  Not Found    Overdue    [employer] Embedded Care Gaps. Reference number: [id number]. 5/12/2022   7:21:17 PM CDT

## 2022-05-29 NOTE — TELEPHONE ENCOUNTER
No new care gaps identified.  [facility] Embedded Care Gaps. Reference number: [id number]. 5/29/2022   6:24:56 PM CDT

## 2022-05-30 RX ORDER — POTASSIUM CHLORIDE 750 MG/1
TABLET, EXTENDED RELEASE ORAL
Qty: 90 TABLET | Refills: 0 | OUTPATIENT
Start: 2022-05-30

## 2022-05-30 NOTE — TELEPHONE ENCOUNTER
Refill Routing Note   Medication(s) are not appropriate for processing by [facility] for the following reason(s):      - Required laboratory values are outdated    [facility] action(s):  Defer       Medication Therapy Plan: FOV;  Medication reconciliation completed: No     Appointments  past 12m or future 3m with PCP    Date Provider   Last Visit   12/23/2021 Luca Alberts MD   Next Visit   Visit date not found Luca Alberts MD   ED visits in past 90 days: 0        Note composed:7:29 AM 05/30/2022

## 2022-05-31 NOTE — TELEPHONE ENCOUNTER
Provider Staff:     Action required for this patient.    Please note Refusal of medication.            Requested Prescriptions     Refused Prescriptions Disp Refills    potassium chloride (KLOR-CON) 10 MEQ TbSR [Pharmacy Med Name: POTASSIUM CL 10MEQ ER TABLETS] 90 tablet 0     Sig: TAKE 1 TABLET(10 MEQ) BY MOUTH DAILY AS NEEDED     Refused By: BIA QUEEN     Reason for Refusal: Other (See comments)   Not needed based on last available labs)   Thanks!  [facility]   Note composed: 05/31/2022 4:21 PM

## 2022-08-17 DIAGNOSIS — I10 BENIGN ESSENTIAL HTN: ICD-10-CM

## 2022-09-27 ENCOUNTER — OFFICE VISIT (OUTPATIENT)
Dept: ALLERGY | Facility: CLINIC | Age: 57
End: 2022-09-27
Payer: COMMERCIAL

## 2022-09-27 ENCOUNTER — LAB VISIT (OUTPATIENT)
Dept: LAB | Facility: HOSPITAL | Age: 57
End: 2022-09-27
Attending: ALLERGY & IMMUNOLOGY
Payer: COMMERCIAL

## 2022-09-27 VITALS
OXYGEN SATURATION: 96 % | WEIGHT: 196.63 LBS | HEART RATE: 78 BPM | BODY MASS INDEX: 27.53 KG/M2 | TEMPERATURE: 98 F | SYSTOLIC BLOOD PRESSURE: 121 MMHG | HEIGHT: 71 IN | DIASTOLIC BLOOD PRESSURE: 80 MMHG

## 2022-09-27 DIAGNOSIS — J31.0 CHRONIC RHINITIS: ICD-10-CM

## 2022-09-27 DIAGNOSIS — J31.0 CHRONIC RHINITIS: Primary | ICD-10-CM

## 2022-09-27 PROCEDURE — 4010F ACE/ARB THERAPY RXD/TAKEN: CPT | Mod: CPTII,S$GLB,, | Performed by: ALLERGY & IMMUNOLOGY

## 2022-09-27 PROCEDURE — 1160F RVW MEDS BY RX/DR IN RCRD: CPT | Mod: CPTII,S$GLB,, | Performed by: ALLERGY & IMMUNOLOGY

## 2022-09-27 PROCEDURE — 99204 PR OFFICE/OUTPT VISIT, NEW, LEVL IV, 45-59 MIN: ICD-10-PCS | Mod: S$GLB,,, | Performed by: ALLERGY & IMMUNOLOGY

## 2022-09-27 PROCEDURE — 4010F PR ACE/ARB THEARPY RXD/TAKEN: ICD-10-PCS | Mod: CPTII,S$GLB,, | Performed by: ALLERGY & IMMUNOLOGY

## 2022-09-27 PROCEDURE — 3074F SYST BP LT 130 MM HG: CPT | Mod: CPTII,S$GLB,, | Performed by: ALLERGY & IMMUNOLOGY

## 2022-09-27 PROCEDURE — 3074F PR MOST RECENT SYSTOLIC BLOOD PRESSURE < 130 MM HG: ICD-10-PCS | Mod: CPTII,S$GLB,, | Performed by: ALLERGY & IMMUNOLOGY

## 2022-09-27 PROCEDURE — 3079F PR MOST RECENT DIASTOLIC BLOOD PRESSURE 80-89 MM HG: ICD-10-PCS | Mod: CPTII,S$GLB,, | Performed by: ALLERGY & IMMUNOLOGY

## 2022-09-27 PROCEDURE — 1159F PR MEDICATION LIST DOCUMENTED IN MEDICAL RECORD: ICD-10-PCS | Mod: CPTII,S$GLB,, | Performed by: ALLERGY & IMMUNOLOGY

## 2022-09-27 PROCEDURE — 86003 ALLG SPEC IGE CRUDE XTRC EA: CPT | Performed by: ALLERGY & IMMUNOLOGY

## 2022-09-27 PROCEDURE — 99204 OFFICE O/P NEW MOD 45 MIN: CPT | Mod: S$GLB,,, | Performed by: ALLERGY & IMMUNOLOGY

## 2022-09-27 PROCEDURE — 36415 COLL VENOUS BLD VENIPUNCTURE: CPT | Performed by: ALLERGY & IMMUNOLOGY

## 2022-09-27 PROCEDURE — 3008F PR BODY MASS INDEX (BMI) DOCUMENTED: ICD-10-PCS | Mod: CPTII,S$GLB,, | Performed by: ALLERGY & IMMUNOLOGY

## 2022-09-27 PROCEDURE — 1159F MED LIST DOCD IN RCRD: CPT | Mod: CPTII,S$GLB,, | Performed by: ALLERGY & IMMUNOLOGY

## 2022-09-27 PROCEDURE — 3008F BODY MASS INDEX DOCD: CPT | Mod: CPTII,S$GLB,, | Performed by: ALLERGY & IMMUNOLOGY

## 2022-09-27 PROCEDURE — 3079F DIAST BP 80-89 MM HG: CPT | Mod: CPTII,S$GLB,, | Performed by: ALLERGY & IMMUNOLOGY

## 2022-09-27 PROCEDURE — 1160F PR REVIEW ALL MEDS BY PRESCRIBER/CLIN PHARMACIST DOCUMENTED: ICD-10-PCS | Mod: CPTII,S$GLB,, | Performed by: ALLERGY & IMMUNOLOGY

## 2022-09-27 RX ORDER — AZELASTINE 1 MG/ML
1 SPRAY, METERED NASAL 2 TIMES DAILY
Qty: 30 ML | Refills: 3 | Status: SHIPPED | OUTPATIENT
Start: 2022-09-27 | End: 2023-05-10 | Stop reason: SDUPTHER

## 2022-09-27 RX ORDER — BUDESONIDE 0.5 MG/2ML
INHALANT ORAL
Qty: 120 ML | Refills: 3 | Status: SHIPPED | OUTPATIENT
Start: 2022-09-27 | End: 2022-11-08 | Stop reason: SDUPTHER

## 2022-09-27 NOTE — LETTER
September 27, 2022        Luca Alberts MD  [address]  [place] [address]             [facility] - Allergy  [address]  [address]  [place] [address]  Phone: [phone]  Fax: [phone]   Patient: Domenico Ford   MR Number: [id number]   YOB: 1965   Date of Visit: 9/27/2022       Dear Dr. Alberts:    Thank you for referring Domenico Ford to me for evaluation. Below are the relevant portions of my assessment and plan of care.            If you have questions, please do not hesitate to call me. I look forward to following Domenico along with you.    Sincerely,      Leigh Putnam MD           CC  No Recipients

## 2022-09-27 NOTE — PROGRESS NOTES
"Subjective:       Patient ID: Domenico Ford is a 57 y.o. male.    Chief Complaint: Allergic Rhinitis  (Has been having frequent sinus infections and drainage)    HPI    Pt presents as a   New patient   For   Rhinitis       Onset: childhood  Sx: congestion , pnd   Coffee helps  Morning is worse  Cold air helps   Season: spring and fall   Allergy testing: never   Tx: choricetin     Urgent care steroid injections help immediately     PMH:  Hyperlipidemia   HTN    Denies lpr symptoms currently     Denies food allergy, asthma     Review of Systems    General: neg unexpected weight changes, fevers, chills, night sweats, malaise  HEENT: see hpi, Neg eye pain, vision changes, ear drainage, nose bleeds, throat tightness, sores in the mouth  CV: Neg chest pain, palpitations, swelling  Resp: see hpi, neg shortness of breath, hemoptysis, cough  GI: see hpi, neg dysphagia, night abdominal pain, reflux, chronic diarrhea, chronic constipation  Derm: See Hpi, neg new rash, neg flushing  Mu/sk: Neg joint pain, joint swelling   Psych: Neg anxiety  neuro: neg chronic headaches, muscle weakness  Endo: neg heat/cold intolerance, chronic fatigue    Objective:     Vitals:    09/27/22 1511   BP: 121/80   Pulse: 78   Temp: 97.6 °F (36.4 °C)   SpO2: 96%   Weight: 89.2 kg (196 lb 9.6 oz)   Height: 5' 11" (1.803 m)        Physical Exam    General: no acute distress, well developed well nourished   HEENT:   Head:normocephalic atraumatic  Eyes: ALLYSSA, EOMI, Neg injection, scleral icterus, or conjunctival papillary hypertrophy.  Ears: tm clear bilaterally, normal canal  Nose: 3+ inferior turbinates pink, neg nasal polyps            Mucosa: mild dryness            Septal irritation: none   OP: mucus membranes moist, - cobblestoning, - PND, neg erythema or lesions  Neck: supple, Full range of motion, neg lymphadenopathy  Chest: full respiratory excursion no abnormal chest abnormality  Resp: clear to ascultation bilaterally  Ext:  Neg clubbing, " cyanosis, pitting edema  Skin: Neg rashes or lesions    Assessment:       1. Chronic rhinitis        Plan:       Chronic rhinitis  -     Inhalant Panel; Future; Expected date: 09/27/2022  -     budesonide (PULMICORT) 0.5 mg/2 mL nebulizer solution; Instill 1 vial into sinus rinse twice per day  Dispense: 120 mL; Refill: 3  -     azelastine (ASTELIN) 137 mcg (0.1 %) nasal spray; 1 spray (137 mcg total) by Nasal route 2 (two) times daily.  Dispense: 30 mL; Refill: 3            Chronic rhinitis   9/22:  Saline rinses with bduesondie   Azelastine prn q 6 hrs   Consider ipratorpium     Consider lpr and therapy.     Based upon htn  Sige panel   Will need to be off beta blockers if ait is pursued.       Follow up in 6 weeks, sooner if needed      Leigh Putnam M.D.  Allergy/Immunology  [facility]   [phone] phone  [phone] fax

## 2022-09-27 NOTE — PATIENT INSTRUCTIONS
Put distilled water into the keegan med sinus rinse bottle and stop at the black line. Then pour that amount into a microwavable mug, microwave 10 seconds then put into the distilled water back into the bottle.   Then place xlear packet into and mix. The packets that come with the keegan med can be used instead or kept for backup if you run out of xlear.      - over the counter, can buy online at [employer].        Then place budesonide- respules into mixture, this will replace the intranasal steroid spray. (flonase/fluticasone, rhinocort, nasacort)              Hold breath, squeeze GENTLY ! And blow out. Easiest probably in the shower.      Must be used daily or this will not be effective.     Use 2 vials daily or 1 vial twice per day.     If you feel worsening symptoms, try doubling the dose x 2 weeks, then back to the baseline dose.       Tips to avoid unexpected drainage after rinsing     In rare situations, especially if you have had sinus surgery, the saline solution can pool in the sinus cavities and nasal passages and then drip from your nostrils hours after rinsing. To avoid this harmless but annoying inconvenience, take one extra step after rinsing: lean forward, tilt your head sideways and gently blow your nose. Then, tilt your head to the other side and blow again. You may need to repeat this several times. This will help rid your nasal passages of any excess mucus and remaining saline solution. If you find yourself experiencing delayed drainage often, do not rinse right before leaving your house or going to bed.      Azelastine- use as needed for congestion and post nasal drip. Use 1 spray per nare every 6 hours.    Head down  Aim nasal spray tip up and out   Spray DON'T sniff   Let the spray drip out the front  Pat dry and lift head.     Use oral antihistamines as needed for itching or sneezing.     Allergy test lab at [facility] [place]

## 2022-10-06 LAB
A ALTERNATA IGE QN: <0.1 KU/L
A FUMIGATUS IGE QN: <0.1 KU/L
ALLERGEN CLASS DESCRIPTION: ABNORMAL
ALLERGEN SILVER BIRCH TREE IGE: 1.06 KU/L
AMER ROACH IGE QN: 1.13 KU/L
BAHIA GRASS IGE QN: 2.78 KU/L
BALD CYPRESS IGE QN: 0.99 KU/L
BERMUDA GRASS IGE QN: 1.73 KU/L
BOXELDER IGE QN: 2.09 KU/L
C ALBICANS IGE QN: <0.1 KU/L
C ALBICANS IGE QN: <0.1 KU/L
C HERBARUM IGE QN: <0.1 KU/L
CAT DANDER IGE QN: <0.1 KU/L
CMN PIGWEED IGE QN: 1.85 KU/L
COCKLEBUR IGE QN: 2.2 KU/L
COMMON RAGWEED IGE QN: 1.98 KU/L
COTTONWOOD IGE QN: 1.32 KU/L
CURVULARIA IGE QN: <0.1 KU/L
D FARINAE IGE QN: 69.1 KU/L
D PTERONYSS IGE QN: 60.5 KU/L
DOG DANDER IGE QN: 0.13 KU/L
DOG FENNEL IGE QN: 2.98 KU/L
E PURPURASCENS IGE QN: <0.1 KU/L
ENGL PLANTAIN IGE QN: 1.95 KU/L
F MONILIFORME IGE QN: <0.1 KU/L
GIANT RAGWEED IGE QN: 1.83 KU/L
HACKBERRY TREE IGE QN: 1.93 KU/L
JOHNSON GRASS IGE QN: 2.41 KU/L
LONDON PLANE IGE QN: 2.46 KU/L
MARSH ELDER IGE QN: 2.24 KU/L
MOUSE EPITH IGE QN: <0.1 KU/L
MOUSE URINE PROT IGE QN: <0.1 KU/L
MUGWORT IGE QN: 1.36 KU/L
NETTLE IGE QN: 1.78 KU/L
P NOTATUM IGE QN: <0.1 KU/L
PECAN/HICK TREE IGE QN: 1.53 KU/L
PRIVET IGE QN: 1.39 KU/L
ROACH IGE QN: 1.18 KU/L
SALTWORT IGE QN: 2.87 KU/L
SHEEP SORREL IGE QN: 2.34 KU/L
SWEET GUM IGE QN: 2.22 KU/L
T RUBRUM IGE QN: <0.1 KU/L
TIMOTHY IGE QN: 2.76 KU/L
WHITE ASH IGE QN: 2.57 KU/L
WHITE ELM IGE QN: 2.01 KU/L
WHITE MULBERRY IGE QN: 1.29 KU/L
WHITE OAK IGE QN: 2.31 KU/L
WORMWOOD IGE QN: 1.66 KU/L
YELLOW DOCK IGE QN: 3.02 KU/L

## 2022-11-08 ENCOUNTER — OFFICE VISIT (OUTPATIENT)
Dept: ALLERGY | Facility: CLINIC | Age: 57
End: 2022-11-08
Payer: COMMERCIAL

## 2022-11-08 VITALS
OXYGEN SATURATION: 97 % | DIASTOLIC BLOOD PRESSURE: 80 MMHG | WEIGHT: 196 LBS | SYSTOLIC BLOOD PRESSURE: 122 MMHG | HEART RATE: 93 BPM | HEIGHT: 71 IN | BODY MASS INDEX: 27.44 KG/M2

## 2022-11-08 DIAGNOSIS — J30.1 SEASONAL ALLERGIC RHINITIS DUE TO POLLEN: ICD-10-CM

## 2022-11-08 DIAGNOSIS — J31.0 CHRONIC RHINITIS: ICD-10-CM

## 2022-11-08 DIAGNOSIS — J30.89 ALLERGIC RHINITIS DUE TO DUST: ICD-10-CM

## 2022-11-08 DIAGNOSIS — J30.81 ALLERGIC RHINITIS DUE TO ANIMAL HAIR AND DANDER: Primary | ICD-10-CM

## 2022-11-08 PROCEDURE — 99213 OFFICE O/P EST LOW 20 MIN: CPT | Mod: S$GLB,,, | Performed by: ALLERGY & IMMUNOLOGY

## 2022-11-08 PROCEDURE — 99213 PR OFFICE/OUTPT VISIT, EST, LEVL III, 20-29 MIN: ICD-10-PCS | Mod: S$GLB,,, | Performed by: ALLERGY & IMMUNOLOGY

## 2022-11-08 RX ORDER — BUDESONIDE 0.5 MG/2ML
INHALANT ORAL
Qty: 360 ML | Refills: 2 | Status: SHIPPED | OUTPATIENT
Start: 2022-11-08 | End: 2023-05-10 | Stop reason: SDUPTHER

## 2022-11-08 NOTE — LETTER
November 8, 2022        Luca Alberts MD  [address]  [place] [address]             [facility] - [facility]  [address]  [address]  [place] [address]  Phone: [phone]  Fax: [phone]   Patient: Domenico Ford   MR Number: [id number]   YOB: 1965   Date of Visit: 11/8/2022       Dear Dr. Alberts:    Thank you for referring Domenico Ford to me for evaluation. Below are the relevant portions of my assessment and plan of care.    1. Allergic rhinitis due to animal hair and dander    2. Allergic rhinitis due to dust    3. Seasonal allergic rhinitis due to pollen        Allergic rhinitis due to animal hair and dander    Allergic rhinitis due to dust    Seasonal allergic rhinitis due to pollen          If you have questions, please do not hesitate to call me. I look forward to following Domenico along with you.    Sincerely,      Leigh Putnam MD           CC  No Recipients

## 2022-11-08 NOTE — PATIENT INSTRUCTIONS
Continue budesonide rinses as current.     You may use 2 vials at once instead of twice per day if desired.     Distilled water will save you a lot of time :o)

## 2022-11-08 NOTE — PROGRESS NOTES
"Subjective:       Patient ID: Domenico Ford is a 57 y.o. male.    Chief Complaint: Rhinitis (Routine follow up on rhinitis. Rinses helped a lot. No refills needed)    HPI    Pt presents   For   Rhinitis     Last visit 9/2022   - saline rinses with budesonide, azelastine considered lpr therapy     Sige 9/2022: multiple allergens: dm, animal dander, cr, tree pollen, grass pollen, weed pollen, neg mold.     Rinses help "tremendously"  Likes routine.     Onset: childhood  Sx: congestion , pnd   Coffee helps  Morning is worse  Cold air helps   Season: spring and fall   Allergy testing: sige 9/2022: pollen, indoor   Tx: choricetin   Budesonide risnes rx'd 10/22    Urgent care steroid injections help immediately     PMH:  Hyperlipidemia   HTN    Denies lpr symptoms currently     Denies food allergy, asthma     Component      Latest Ref Rng & Units 9/27/2022   Allergen Class Description       Comment   Mite Dust Pteronyssinus IgE      Class V kU/L 60.50 (A)   D. farinae      Class V kU/L 69.10 (A)   Cat Dander (E1) IgE      Class 0 kU/L <0.10   Dog Dander, IgE      Class 0/I kU/L 0.13 (A)   BERMUDA GRASS      Class III kU/L 1.73 (A)   Jesus Alberto Grass      Class III kU/L 2.76 (A)   JACQUELINE GRASS      Class III kU/L 2.41 (A)   BAHIA GRASS      Class III kU/L 2.78 (A)   Allergen, Cockroach, American, IgE      Class II kU/L 1.13 (A)   Penicillium, IgE      Class 0 kU/L <0.10   Cladosporium, IgE      Class 0 kU/L <0.10   Aspergillus fumigatus      Class 0 kU/L <0.10   Alternaria alternata      Class 0 kU/L <0.10   Mouse Epithelia      Class 0 kU/L <0.10   Allergen, Mouse Urine, IgE      Class 0 kU/L <0.10   IgE Cockroach, Serbian      Class II kU/L 1.18 (A)   Allergen Candida albicans IgE      Class 0 kU/L <0.10   Setomelanomma Rostrata IgE      Class 0 kU/L <0.10   K017-[id number] Fusarium proliferatum      Class 0 kU/L <0.10   Epicoccum purpurascens, IgE      Class 0 kU/L <0.10   Bipolaris IgE      Class 0 kU/L <0.10   RAST Allergen " for Trichophyton rubrum      Class 0 kU/L <0.10   Allergen Maple ([place]) IgE      Class III kU/L 2.09 (A)   Silver Birch IgE      Class II kU/L 1.06 (A)   White Oak(Quercus alba) IgE      Class III kU/L 2.31 (A)   D008-[id number] Elm, American (White      Class III kU/L 2.01 (A)   Maple/Sycamore IgE      Class III kU/L 2.46 (A)   [place] IgE      Class II kU/L 1.32 (A)   WHITE JUAN JOSE TREE      Class III kU/L 2.57 (A)   Pecan [place] Tree      Class III kU/L 1.53 (A)   [place]      Class II kU/L 0.99 (A)   Hackberry Celtis, IgE      Class III kU/L 1.93 (A)   White [place] (T70) IgE      Class II kU/L 1.29 (A)   Privet Tree, IGE      Class II kU/L 1.39 (A)   Allergen Sweet Gum IgE      Class III kU/L 2.22 (A)   Ragweed, Short, IgE      Class III kU/L 1.98 (A)   [id number] Ragweed, Giant      Class III kU/L 1.83 (A)   [place] IgE      Class III kU/L 1.66 (A)   MUGWORT      Class II kU/L 1.36 (A)   Plantain      Class III kU/L 1.95 (A)   Russian Thistle IgE      Class III kU/L 2.87 (A)   Cocklebur, IgE      Class III kU/L 2.20 (A)   Common Pigweed IgE      Class III kU/L 1.85 (A)   Rough Marshelder      Class III kU/L 2.24 (A)   Allergen Sheep [place] (Yel Dock) IgE      Class III kU/L 2.34 (A)   NETTLE      Class III kU/L 1.78 (A)   Yellow Dockweed IgE      Class III kU/L 3.02 (A)   Dog Fennel IgE      Class III kU/L 2.98 (A)     Review of Systems    General: neg unexpected weight changes, fevers, chills, night sweats, malaise  HEENT: see hpi, Neg eye pain, vision changes, ear drainage, nose bleeds, throat tightness, sores in the mouth  CV: Neg chest pain, palpitations, swelling  Resp: see hpi, neg shortness of breath, hemoptysis, cough  GI: see hpi, neg dysphagia, night abdominal pain, reflux, chronic diarrhea, chronic constipation  Derm: See Hpi, neg new rash, neg flushing  Mu/sk: Neg joint pain, joint swelling   Psych: Neg anxiety  neuro: neg chronic headaches, muscle weakness  Endo: neg heat/cold intolerance, chronic  "fatigue    Objective:     Vitals:    11/08/22 1544   BP: 122/80   Pulse: 93   SpO2: 97%   Weight: 88.9 kg (196 lb)   Height: 5' 11" (1.803 m)          Physical Exam    General: no acute distress, well developed well nourished     Assessment:       1. Allergic rhinitis due to animal hair and dander    2. Allergic rhinitis due to dust    3. Seasonal allergic rhinitis due to pollen    4. Chronic rhinitis          Plan:       Allergic rhinitis due to animal hair and dander    Allergic rhinitis due to dust    Seasonal allergic rhinitis due to pollen    Chronic rhinitis  -     budesonide (PULMICORT) 0.5 mg/2 mL nebulizer solution; Instill 1 vial into sinus rinse twice per day  Dispense: 360 mL; Refill: 2              Allergic rhinitis   11/22:  Continue budsonide rinses BID   Azelastine prn   RN improved.     9/22:  Saline rinses with bduesondie   Azelastine prn q 6 hrs   Consider ipratorpium     Consider lpr and therapy.     Based upon htn  Sige panel   Will need to be off beta blockers if ait is pursued.   We dsicuss ait and defer today       Follow up in 6 months , sooner if needed       Leigh Putnam M.D.  Allergy/Immunology  [facility]   [phone] phone  [phone] fax          "

## 2023-03-16 RX ORDER — METOPROLOL SUCCINATE 50 MG/1
50 TABLET, EXTENDED RELEASE ORAL NIGHTLY
Qty: 90 TABLET | Refills: 0 | Status: SHIPPED | OUTPATIENT
Start: 2023-03-16

## 2023-03-16 NOTE — TELEPHONE ENCOUNTER
Care Due:                  Date            Visit Type   Department     Provider  --------------------------------------------------------------------------------                                EP -                              PRIMARY      [facility]    Luca Leahy  Last Visit: 12-      CARE ([facility])   MEDICINE       [facility]  Next Visit: None Scheduled  None         None Found                                                            Last  Test          Frequency    Reason                     Performed    Due Date  --------------------------------------------------------------------------------    Office Visit  12 months..  atorvastatin, lisinopriL,   12- 12-                             metoprolol, potassium....    CMP.........  12 months..  atorvastatin, lisinopriL,   Not Found    Overdue                             potassium................    Lipid Panel.  12 months..  atorvastatin.............  Not Found    Overdue    [employer] Embedded Care Gaps. Reference number: [id number]. 3/16/2023   3:54:48 AM CDT

## 2023-03-16 NOTE — TELEPHONE ENCOUNTER
Provider Staff:  Action required for this patient     Please see care gap opportunities below in Care Due Message: Due for routine wellness exam with PCP, Labs (CMP, Lipid panel) outdated.     Thanks!  [facility]     Appointments     Last Visit   12/23/2021 Luca Alberts MD   Next Visit   Visit date not found Luca Alberts MD       Refill Decision Note   Domenico Ford  is requesting a refill authorization.  Brief Assessment and Rationale for Refill:  Approve       Medication Therapy Plan:  Due for routine wellness exam with PCP, Labs (CMP, Lipid panel) outdated    Requires appointment: Yes   Requires labs: Yes   Comments:   Note composed:2:25 PM 03/16/2023

## 2023-05-10 ENCOUNTER — OFFICE VISIT (OUTPATIENT)
Dept: ALLERGY | Facility: CLINIC | Age: 58
End: 2023-05-10
Payer: COMMERCIAL

## 2023-05-10 VITALS
DIASTOLIC BLOOD PRESSURE: 83 MMHG | SYSTOLIC BLOOD PRESSURE: 123 MMHG | BODY MASS INDEX: 27.34 KG/M2 | WEIGHT: 196 LBS | TEMPERATURE: 99 F | HEART RATE: 89 BPM

## 2023-05-10 DIAGNOSIS — J30.1 SEASONAL ALLERGIC RHINITIS DUE TO POLLEN: ICD-10-CM

## 2023-05-10 DIAGNOSIS — J30.89 ALLERGIC RHINITIS DUE TO DUST: ICD-10-CM

## 2023-05-10 DIAGNOSIS — J30.81 ALLERGIC RHINITIS DUE TO ANIMAL HAIR AND DANDER: ICD-10-CM

## 2023-05-10 DIAGNOSIS — J30.81 ALLERGIC RHINITIS DUE TO ANIMAL HAIR AND DANDER: Primary | ICD-10-CM

## 2023-05-10 DIAGNOSIS — J31.0 CHRONIC RHINITIS: ICD-10-CM

## 2023-05-10 PROCEDURE — 1159F PR MEDICATION LIST DOCUMENTED IN MEDICAL RECORD: ICD-10-PCS | Mod: CPTII,S$GLB,, | Performed by: ALLERGY & IMMUNOLOGY

## 2023-05-10 PROCEDURE — 1160F PR REVIEW ALL MEDS BY PRESCRIBER/CLIN PHARMACIST DOCUMENTED: ICD-10-PCS | Mod: CPTII,S$GLB,, | Performed by: ALLERGY & IMMUNOLOGY

## 2023-05-10 PROCEDURE — 3079F DIAST BP 80-89 MM HG: CPT | Mod: CPTII,S$GLB,, | Performed by: ALLERGY & IMMUNOLOGY

## 2023-05-10 PROCEDURE — 3074F PR MOST RECENT SYSTOLIC BLOOD PRESSURE < 130 MM HG: ICD-10-PCS | Mod: CPTII,S$GLB,, | Performed by: ALLERGY & IMMUNOLOGY

## 2023-05-10 PROCEDURE — 4010F PR ACE/ARB THEARPY RXD/TAKEN: ICD-10-PCS | Mod: CPTII,S$GLB,, | Performed by: ALLERGY & IMMUNOLOGY

## 2023-05-10 PROCEDURE — 99214 PR OFFICE/OUTPT VISIT, EST, LEVL IV, 30-39 MIN: ICD-10-PCS | Mod: S$GLB,,, | Performed by: ALLERGY & IMMUNOLOGY

## 2023-05-10 PROCEDURE — 3008F PR BODY MASS INDEX (BMI) DOCUMENTED: ICD-10-PCS | Mod: CPTII,S$GLB,, | Performed by: ALLERGY & IMMUNOLOGY

## 2023-05-10 PROCEDURE — 1159F MED LIST DOCD IN RCRD: CPT | Mod: CPTII,S$GLB,, | Performed by: ALLERGY & IMMUNOLOGY

## 2023-05-10 PROCEDURE — 3008F BODY MASS INDEX DOCD: CPT | Mod: CPTII,S$GLB,, | Performed by: ALLERGY & IMMUNOLOGY

## 2023-05-10 PROCEDURE — 3079F PR MOST RECENT DIASTOLIC BLOOD PRESSURE 80-89 MM HG: ICD-10-PCS | Mod: CPTII,S$GLB,, | Performed by: ALLERGY & IMMUNOLOGY

## 2023-05-10 PROCEDURE — 4010F ACE/ARB THERAPY RXD/TAKEN: CPT | Mod: CPTII,S$GLB,, | Performed by: ALLERGY & IMMUNOLOGY

## 2023-05-10 PROCEDURE — 1160F RVW MEDS BY RX/DR IN RCRD: CPT | Mod: CPTII,S$GLB,, | Performed by: ALLERGY & IMMUNOLOGY

## 2023-05-10 PROCEDURE — 3074F SYST BP LT 130 MM HG: CPT | Mod: CPTII,S$GLB,, | Performed by: ALLERGY & IMMUNOLOGY

## 2023-05-10 PROCEDURE — 99214 OFFICE O/P EST MOD 30 MIN: CPT | Mod: S$GLB,,, | Performed by: ALLERGY & IMMUNOLOGY

## 2023-05-10 RX ORDER — AZELASTINE 1 MG/ML
1 SPRAY, METERED NASAL 2 TIMES DAILY
Qty: 90 ML | Refills: 4 | Status: SHIPPED | OUTPATIENT
Start: 2023-05-10 | End: 2024-05-09

## 2023-05-10 RX ORDER — BUDESONIDE 0.5 MG/2ML
INHALANT ORAL
Qty: 360 ML | Refills: 4 | Status: SHIPPED | OUTPATIENT
Start: 2023-05-10

## 2023-05-10 RX ORDER — LEVOCETIRIZINE DIHYDROCHLORIDE 5 MG/1
5 TABLET, FILM COATED ORAL 2 TIMES DAILY
Qty: 60 TABLET | Refills: 4 | Status: SHIPPED | OUTPATIENT
Start: 2023-05-10 | End: 2024-05-09

## 2023-05-10 RX ORDER — MONTELUKAST SODIUM 10 MG/1
10 TABLET ORAL NIGHTLY
Qty: 30 TABLET | Refills: 0 | Status: SHIPPED | OUTPATIENT
Start: 2023-05-10 | End: 2023-06-09

## 2023-05-10 RX ORDER — FLUTICASONE PROPIONATE 110 UG/1
AEROSOL, METERED RESPIRATORY (INHALATION)
Qty: 12 G | Refills: 3 | Status: SHIPPED | OUTPATIENT
Start: 2023-05-10

## 2023-05-10 NOTE — PROGRESS NOTES
"Subjective:       Patient ID: Domenico Ford is a 58 y.o. male.    Chief Complaint: chronic rhinitis  and Chronic rhinitis (Patient is still having nasal congestion, sneezing and a lot of drainage in the nose /\budesonide saline rinses are not working anymore /)      HPI    Pt presents   For   Rhinitis     Last visit 11/2022   - saline rinses with budesonide, azelastine considered lpr therapy     Sige 9/2022: multiple allergens: dm, animal dander, cr, tree pollen, grass pollen, weed pollen, neg mold.     Rinses help "tremendously"  Likes routine.     Onset: childhood  Sx: congestion , pnd   Coffee helps  Morning is worse  Cold air helps   Season: spring and fall   Allergy testing: sige 9/2022: pollen, indoor   Tx: choricetin   Budesonide risnes rx'd 10/22    Urgent care steroid injections help immediately     PMH:  Hyperlipidemia   HTN    Denies lpr symptoms currently     Denies food allergy, asthma     Component      Latest Ref Rng & Units 9/27/2022   Allergen Class Description       Comment   Mite Dust Pteronyssinus IgE      Class V kU/L 60.50 (A)   D. farinae      Class V kU/L 69.10 (A)   Cat Dander (E1) IgE      Class 0 kU/L <0.10   Dog Dander, IgE      Class 0/I kU/L 0.13 (A)   BERMUDA GRASS      Class III kU/L 1.73 (A)   Jesus Alberto Grass      Class III kU/L 2.76 (A)   JACQUELINE GRASS      Class III kU/L 2.41 (A)   BAHIA GRASS      Class III kU/L 2.78 (A)   Allergen, Cockroach, American, IgE      Class II kU/L 1.13 (A)   Penicillium, IgE      Class 0 kU/L <0.10   Cladosporium, IgE      Class 0 kU/L <0.10   Aspergillus fumigatus      Class 0 kU/L <0.10   Alternaria alternata      Class 0 kU/L <0.10   Mouse Epithelia      Class 0 kU/L <0.10   Allergen, Mouse Urine, IgE      Class 0 kU/L <0.10   IgE Cockroach, [place]      Class II kU/L 1.18 (A)   Allergen Candida albicans IgE      Class 0 kU/L <0.10   Setomelanomma Rostrata IgE      Class 0 kU/L <0.10   M433-[id number] Fusarium proliferatum      Class 0 kU/L <0.10   Epicoccum " purpurascens, IgE      Class 0 kU/L <0.10   Bipolaris IgE      Class 0 kU/L <0.10   RAST Allergen for Trichophyton rubrum      Class 0 kU/L <0.10   Allergen Maple ([place]) IgE      Class III kU/L 2.09 (A)   Silver Birch IgE      Class II kU/L 1.06 (A)   White Oak(Quercus alba) IgE      Class III kU/L 2.31 (A)   [id number]-[id number] Elm, American (White      Class III kU/L 2.01 (A)   Maple/Sycamore IgE      Class III kU/L 2.46 (A)   [place] IgE      Class II kU/L 1.32 (A)   WHITE JUAN JOSE TREE      Class III kU/L 2.57 (A)   Pecan [place] Tree      Class III kU/L 1.53 (A)   [place]      Class II kU/L 0.99 (A)   Hackberry Celtis, IgE      Class III kU/L 1.93 (A)   White [place] (T70) IgE      Class II kU/L 1.29 (A)   Privet Tree, IGE      Class II kU/L 1.39 (A)   Allergen Sweet Gum IgE      Class III kU/L 2.22 (A)   Ragweed, Short, IgE      Class III kU/L 1.98 (A)   S813-[id number] Ragweed, Giant      Class III kU/L 1.83 (A)   [place] IgE      Class III kU/L 1.66 (A)   MUGWORT      Class II kU/L 1.36 (A)   Plantain      Class III kU/L 1.95 (A)   Russian Thistle IgE      Class III kU/L 2.87 (A)   Cocklebur, IgE      Class III kU/L 2.20 (A)   Common Pigweed IgE      Class III kU/L 1.85 (A)   Rough Marshelder      Class III kU/L 2.24 (A)   Allergen Sheep [place] (Yel Dock) IgE      Class III kU/L 2.34 (A)   NETTLE      Class III kU/L 1.78 (A)   Yellow Dockweed IgE      Class III kU/L 3.02 (A)   Dog Fennel IgE      Class III kU/L 2.98 (A)     Review of Systems    General: neg unexpected weight changes, fevers, chills, night sweats, malaise  HEENT: see hpi, Neg eye pain, vision changes, ear drainage, nose bleeds, throat tightness, sores in the mouth  CV: Neg chest pain, palpitations, swelling  Resp: see hpi, neg shortness of breath, hemoptysis, cough  GI: see hpi, neg dysphagia, night abdominal pain, reflux, chronic diarrhea, chronic constipation  Derm: See Hpi, neg new rash, neg flushing  Mu/sk: Neg joint pain, joint swelling   Psych: Neg  anxiety  neuro: neg chronic headaches, muscle weakness  Endo: neg heat/cold intolerance, chronic fatigue    Objective:     Vitals:    05/10/23 1614   BP: 123/83   Pulse: 89   Temp: 98.6 °F (37 °C)   Weight: 88.9 kg (196 lb)            Physical Exam    General: no acute distress, well developed well nourished   Nose:2-3+   Moist         Assessment:       1. Allergic rhinitis due to animal hair and dander    2. Allergic rhinitis due to dust    3. Seasonal allergic rhinitis due to pollen    4. Chronic rhinitis            Plan:       Allergic rhinitis due to animal hair and dander  -     levocetirizine (XYZAL) 5 MG tablet; Take 1 tablet (5 mg total) by mouth 2 (two) times a day.  Dispense: 60 tablet; Refill: 4  -     fluticasone propionate (FLOVENT HFA) 110 mcg/actuation inhaler; 1 puff per nostril with speculum twice per day or 2 puffs per nostril daily.  Dispense: 12 g; Refill: 3  -     montelukast (SINGULAIR) 10 mg tablet; Take 1 tablet (10 mg total) by mouth every evening.  Dispense: 30 tablet; Refill: 0    Allergic rhinitis due to dust  -     levocetirizine (XYZAL) 5 MG tablet; Take 1 tablet (5 mg total) by mouth 2 (two) times a day.  Dispense: 60 tablet; Refill: 4  -     fluticasone propionate (FLOVENT HFA) 110 mcg/actuation inhaler; 1 puff per nostril with speculum twice per day or 2 puffs per nostril daily.  Dispense: 12 g; Refill: 3  -     montelukast (SINGULAIR) 10 mg tablet; Take 1 tablet (10 mg total) by mouth every evening.  Dispense: 30 tablet; Refill: 0    Seasonal allergic rhinitis due to pollen  -     levocetirizine (XYZAL) 5 MG tablet; Take 1 tablet (5 mg total) by mouth 2 (two) times a day.  Dispense: 60 tablet; Refill: 4  -     fluticasone propionate (FLOVENT HFA) 110 mcg/actuation inhaler; 1 puff per nostril with speculum twice per day or 2 puffs per nostril daily.  Dispense: 12 g; Refill: 3  -     montelukast (SINGULAIR) 10 mg tablet; Take 1 tablet (10 mg total) by mouth every evening.  Dispense:  30 tablet; Refill: 0    Chronic rhinitis  -     azelastine (ASTELIN) 137 mcg (0.1 %) nasal spray; 1 spray (137 mcg total) by Nasal route 2 (two) times daily.  Dispense: 90 mL; Refill: 4  -     budesonide (PULMICORT) 0.5 mg/2 mL nebulizer solution; Instill 1 vial into sinus rinse twice per day  Dispense: 360 mL; Refill: 4                  Allergic rhinitis   5/23:  Continue budesonide rinses when available   Flovent 110 mcg 1 puff per nostril twice per day   Start levocetirizine 5 mg po bid or allegra   Start montelukast 10 mg po day     11/22:  Continue budsonide rinses BID   Azelastine prn   RN improved.     9/22:  Saline rinses with bduesondie   Azelastine prn q 6 hrs   Consider ipratorpium     Consider lpr and therapy.     Based upon htn  Sige panel   Will need to be off beta blockers if ait is pursued.   We dsicuss ait and defer today       Follow up in 3 months , sooner if needed       Leigh Putnam M.D.  Allergy/Immunology  [facility]   [phone] phone  [phone] fax

## 2023-05-10 NOTE — LETTER
May 10, 2023        Kaushik Silver MD  [address]  [place] [address]             [facility] - [facility]ergy  [address]  [address]  [place] [address]  Phone: [phone]  Fax: [phone]   Patient: Domenico Ford   MR Number: [id number]   YOB: 1965   Date of Visit: 5/10/2023       Dear Dr. Silver:    Thank you for referring Domenico Ford to me for evaluation. Below are the relevant portions of my assessment and plan of care.    1. Allergic rhinitis due to animal hair and dander    2. Allergic rhinitis due to dust    3. Seasonal allergic rhinitis due to pollen    4. Chronic rhinitis          Allergic rhinitis due to animal hair and dander    Allergic rhinitis due to dust    Seasonal allergic rhinitis due to pollen    Chronic rhinitis  -     azelastine (ASTELIN) 137 mcg (0.1 %) nasal spray; 1 spray (137 mcg total) by Nasal route 2 (two) times daily.  Dispense: 90 mL; Refill: 4  -     budesonide (PULMICORT) 0.5 mg/2 mL nebulizer solution; Instill 1 vial into sinus rinse twice per day  Dispense: 360 mL; Refill: 4              If you have questions, please do not hesitate to call me. I look forward to following Domenico along with you.    Sincerely,      Leigh Putnam MD           CC  No Recipients

## 2023-05-10 NOTE — PATIENT INSTRUCTIONS
- buy over the counter at any pharmacy or [employer]     Put distilled water into the keegan med sinus rinse bottle and stop at the black line.     Then, pour that amount into a microwavable mug, microwave 10 seconds then put into the distilled water back into the bottle.     Then place xlear packet into and mix. The packets that come with the keegan med can be used instead or kept for backup if you run out of xlear.      - buy over the counter, can buy online at [employer].        Then place budesonide vial into distilled water and xlear/salt packet mixture.    This will replace the intranasal steroid spray. (flonase/fluticasone, rhinocort, nasacort) unless you want to use both.       - budesonide vials     Breathe in via mouth. Hold breath, squeeze GENTLY ! And blow out via the nose.      The shower is a popular location. Steam can also help open the sinuses.       Budesonide vials Must be used daily for at least 2-4 weeks, or this will not be effective.     Dose:  Use 2 vials daily or 1 vial twice per day.     If you feel worsening symptoms, try doubling the dose x 2 weeks, then back to the baseline or original dose.     If there is a burning sensation, add baking soda to neutralize the ph. Make sure the ratios are mixed correctly.     Tips to avoid unexpected drainage after rinsing     In rare situations, especially if you have had sinus surgery, the saline solution can pool in the sinus cavities and nasal passages and then drip from your nostrils hours after rinsing. To avoid this harmless, but annoying inconvenience, take one extra step after rinsing: lean forward, tilt your head sideways and gently blow your nose. Then, tilt your head to the other side and blow again. You may need to repeat this several times. This will help rid your nasal passages of any excess mucus and remaining saline solution. If you find yourself experiencing delayed drainage often, do not rinse right before leaving your house or going  to bed.    additional nasal spray/sprays that can work well with budesonide rinses at the same time    Azelastine- use as needed for congestion and post nasal drip.   Dose: Use 1 spray per nare every 6 hours.      Nasal Spray Technique:  Head down  Aim nasal spray tip up and out   Spray DON'T sniff   Let the spray drip out the front  Pat dry and lift head.       Saline MIST first   FLovnet 110 mcg 1 puff per nostril twice per day AFTER saline mist     We can use levocetirizine twice per day   Or   Allegra in the morning and levocetirizine at night.     Take montelukast at night 10 mg nightly or as needed.   If this medicine makes you feel funny , stop it.     If you feel dryness in the nose, then, use vaseline on a q tip and swab the nostrils.     Let me know if you want to do allergy shots.     Follow up in 3 months, sooner if needed.

## 2023-06-10 DIAGNOSIS — J30.81 ALLERGIC RHINITIS DUE TO ANIMAL HAIR AND DANDER: ICD-10-CM

## 2023-06-10 DIAGNOSIS — J30.89 ALLERGIC RHINITIS DUE TO DUST: ICD-10-CM

## 2023-06-10 DIAGNOSIS — J30.1 SEASONAL ALLERGIC RHINITIS DUE TO POLLEN: ICD-10-CM

## 2023-08-14 ENCOUNTER — DOCUMENTATION ONLY (OUTPATIENT)
Dept: ALLERGY | Facility: CLINIC | Age: 58
End: 2023-08-14

## 2023-08-14 DIAGNOSIS — Z91.199 NO-SHOW FOR APPOINTMENT: Primary | ICD-10-CM

## 2023-08-14 RX ORDER — MONTELUKAST SODIUM 10 MG/1
TABLET ORAL
Qty: 30 TABLET | Refills: 0 | OUTPATIENT
Start: 2023-08-14

## 2023-08-14 RX ORDER — MONTELUKAST SODIUM 10 MG/1
TABLET ORAL
Qty: 90 TABLET | OUTPATIENT
Start: 2023-08-14

## 2023-10-11 ENCOUNTER — PATIENT MESSAGE (OUTPATIENT)
Dept: FAMILY MEDICINE | Facility: CLINIC | Age: 58
End: 2023-10-11

## 2024-07-05 ENCOUNTER — OFFICE VISIT (OUTPATIENT)
Dept: UROLOGY | Facility: CLINIC | Age: 59
End: 2024-07-05
Payer: COMMERCIAL

## 2024-07-05 VITALS
WEIGHT: 178.81 LBS | SYSTOLIC BLOOD PRESSURE: 141 MMHG | HEART RATE: 78 BPM | HEIGHT: 71 IN | BODY MASS INDEX: 25.03 KG/M2 | DIASTOLIC BLOOD PRESSURE: 91 MMHG

## 2024-07-05 DIAGNOSIS — E29.1 HYPOGONADISM IN MALE: ICD-10-CM

## 2024-07-05 DIAGNOSIS — R97.20 RISING PSA LEVEL: Primary | ICD-10-CM

## 2024-07-05 PROCEDURE — 99999 PR PBB SHADOW E&M-EST. PATIENT-LVL III: CPT | Mod: PBBFAC,,, | Performed by: UROLOGY

## 2024-07-05 NOTE — PROGRESS NOTES
"[facility] Established Patient/H&P:    Domenico Ford is a 59 y.o. male who presents for rising PSA.    Patient with a history of HTN and stroke who was referred by a men's health clinic for rising PSA. He is on Testosterone 100 mg every week per his clinic in [place] since 2017. Also saw Dr. Macario in the past with Androgel.     He underwent PSA testing and it was up to 3.62 at [facility] in 6/2024. Patient states that his PSA was approximately 1 one year prior (no record available).     Grandfather and maternal uncle with prostate cancer. Mother with lung cancer.     He has no significant lower urinary tract symptoms. He does have moderate erectile dysfunction managed with Cialis and Viagra as needed.     Works as a .     Denies any fever, chills, gross hematuria, flank pain, bone pain, unintentional weight loss,  trauma or personal history of  malignancy.       PSA  3.62*  6/10/24    Testosterone  666*  6/10/24    A1C  5.9*  6/10/24    *[facility]    Past Medical History:   Diagnosis Date    Hypertension     Stroke 2017    HEMMORGIC    Wears glasses        Past Surgical History:   Procedure Laterality Date    HERNIA REPAIR      [facility]    LAPAROSCOPIC CHOLECYSTECTOMY N/A 12/3/2020    Procedure: CHOLECYSTECTOMY, LAPAROSCOPIC;  Surgeon: Reed Moulton MD;  Location: [facility];  Service: General;  Laterality: N/A;       Review of patient's allergies indicates:   Allergen Reactions    Hydrocodone      Other reaction(s): Unknown    Hydrocodone-acetaminophen Other (See Comments)     SHAKING, DECREASED MOTOR SKILLS    Oxycodone-acetaminophen     Hydrocodone-ibuprofen Nausea And Vomiting       Medications Reviewed: see MAR    FOCUSED PHYSICAL EXAM:    Vitals:    07/05/24 1305   BP: (!) 141/91   Pulse: 78     Body mass index is 24.94 kg/m². Weight: 81.1 kg (178 lb 12.7 oz) Height: 5' 11" (180.3 cm)       General: Alert, cooperative, no distress, appears stated age  Abdomen: " Soft, non-tender, no CVA tenderness, non-distended        LABS:    No results found for this or any previous visit (from the past 336 hour(s)).        Assessment/Diagnosis:    1. Rising PSA level  PROSTATE SPECIFIC ANTIGEN, DIAGNOSTIC      2. Hypogonadism in male            Plans:    - I spent 45 minutes of the day of this encounter preparing for, treating and managing this patient. The natural history of prostate cancer and ongoing controversy regarding screening and potential treatment outcomes of prostate cancer has been discussed with the patient. The meaning of a false positive PSA and a false negative PSA has been discussed. He indicates understanding of the limitations of this screening test.   - Repeat PSA in 9/2024. If continues to rise, we discussed he may benefit from MRI prostate given the rise on testosterone replacement.   - Ok to continue TRT with his [facility].   - Further plan contingent on PSA results.

## 2024-09-05 ENCOUNTER — LAB VISIT (OUTPATIENT)
Dept: LAB | Facility: HOSPITAL | Age: 59
End: 2024-09-05
Attending: UROLOGY
Payer: COMMERCIAL

## 2024-09-05 DIAGNOSIS — R97.20 RISING PSA LEVEL: ICD-10-CM

## 2024-09-05 LAB — COMPLEXED PSA SERPL-MCNC: 3.3 NG/ML (ref 0–4)

## 2024-09-05 PROCEDURE — 84153 ASSAY OF PSA TOTAL: CPT | Performed by: UROLOGY

## 2024-09-05 PROCEDURE — 36415 COLL VENOUS BLD VENIPUNCTURE: CPT | Performed by: UROLOGY

## 2024-10-01 ENCOUNTER — OFFICE VISIT (OUTPATIENT)
Dept: PULMONOLOGY | Facility: CLINIC | Age: 59
End: 2024-10-01
Payer: COMMERCIAL

## 2024-10-01 VITALS
OXYGEN SATURATION: 95 % | SYSTOLIC BLOOD PRESSURE: 120 MMHG | WEIGHT: 190 LBS | DIASTOLIC BLOOD PRESSURE: 80 MMHG | HEIGHT: 71 IN | BODY MASS INDEX: 26.6 KG/M2 | HEART RATE: 78 BPM

## 2024-10-01 DIAGNOSIS — G47.33 OBSTRUCTIVE SLEEP APNEA: Primary | ICD-10-CM

## 2024-10-01 PROCEDURE — 3079F DIAST BP 80-89 MM HG: CPT | Mod: CPTII,S$GLB,, | Performed by: INTERNAL MEDICINE

## 2024-10-01 PROCEDURE — 3074F SYST BP LT 130 MM HG: CPT | Mod: CPTII,S$GLB,, | Performed by: INTERNAL MEDICINE

## 2024-10-01 PROCEDURE — 99999 PR PBB SHADOW E&M-EST. PATIENT-LVL IV: CPT | Mod: PBBFAC,,, | Performed by: INTERNAL MEDICINE

## 2024-10-01 PROCEDURE — 99204 OFFICE O/P NEW MOD 45 MIN: CPT | Mod: S$GLB,,, | Performed by: INTERNAL MEDICINE

## 2024-10-01 PROCEDURE — 1159F MED LIST DOCD IN RCRD: CPT | Mod: CPTII,S$GLB,, | Performed by: INTERNAL MEDICINE

## 2024-10-01 PROCEDURE — 4010F ACE/ARB THERAPY RXD/TAKEN: CPT | Mod: CPTII,S$GLB,, | Performed by: INTERNAL MEDICINE

## 2024-10-01 PROCEDURE — 3008F BODY MASS INDEX DOCD: CPT | Mod: CPTII,S$GLB,, | Performed by: INTERNAL MEDICINE

## 2024-10-01 NOTE — PROGRESS NOTES
SUBJECTIVE:    Patient ID: Domenico Ford is a 59 y.o. male.    Chief Complaint: Establish Care    HPI The patient is here witnessed apneas and snoring.  He went on vacation with his daughter-in-law who is a nurse who found him to be snoring and having apneas which awakened him.  No morning headaches, he will fall asleep if he stops.  He has a loss of libido.  He had a stroke in 2017 and he perceives this is a sequelae from that..   Past Medical History:   Diagnosis Date    Hypertension     Stroke 2017    HEMMORGIC    Wears glasses      Past Surgical History:   Procedure Laterality Date    HERNIA REPAIR      [facility]    LAPAROSCOPIC CHOLECYSTECTOMY N/A 12/3/2020    Procedure: CHOLECYSTECTOMY, LAPAROSCOPIC;  Surgeon: Reed Moulton MD;  Location: [facility];  Service: General;  Laterality: N/A;     No family history on file.     Social History:   Marital Status:   Occupation: supervisor of a machine shop for [employer]  Alcohol History:  reports current alcohol use. 1/ weekend  Tobacco History:  reports that he has never smoked. He has been exposed to tobacco smoke. He has never used smokeless tobacco.  Drug History:  reports no history of drug use.    Review of patient's allergies indicates:   Allergen Reactions    Hydrocodone      Other reaction(s): Unknown    Hydrocodone-acetaminophen Other (See Comments)     SHAKING, DECREASED MOTOR SKILLS    Oxycodone-acetaminophen     Hydrocodone-ibuprofen Nausea And Vomiting       Current Outpatient Medications   Medication Sig Dispense Refill    atorvastatin (LIPITOR) 10 MG tablet TAKE 1 TABLET(10 MG) BY MOUTH EVERY EVENING 90 tablet 1    budesonide (PULMICORT) 0.5 mg/2 mL nebulizer solution Instill 1 vial into sinus rinse twice per day 360 mL 4    calcium carbonate/vitamin D3 (VITAMIN D-3 ORAL) Take by mouth once daily.      fluticasone propionate (FLOVENT HFA) 110 mcg/actuation inhaler 1 puff per nostril with speculum twice per day or 2 puffs per nostril daily. 12 g 3  "   levocetirizine (XYZAL) 5 MG tablet Take 1 tablet (5 mg total) by mouth 2 (two) times a day. 60 tablet 4    lisinopriL (PRINIVIL,ZESTRIL) 20 MG tablet TAKE 1 TABLET BY MOUTH EVERY MORNING 90 tablet 1    metoprolol succinate (TOPROL-XL) 50 MG 24 hr tablet Take 1 tablet (50 mg total) by mouth every evening. Due for annual with PCP, Labs 90 tablet 0    multivit with min-folic acid 0.4 mg Tab 1 tablet.      multivitamin capsule Take 1 capsule by mouth once daily.      potassium chloride (KLOR-CON) 10 MEQ TbSR Take 1 tablet (10 mEq total) by mouth daily as needed. 90 tablet 1    sildenafil citrate (SILDENAFIL ORAL) CHEW 1 TO 2 TABLETS BY MOUTH 30 TO 45 MINUTES BEFORE SEXUAL ACTIVITY AS NEEDED      testosterone cypionate (DEPOTESTOTERONE CYPIONATE) 100 mg/mL injection Inject 100 mg into the muscle every 7 days. (Patient not taking: Reported on 10/1/2024)      tirbanibulin (KLISYRI) 1 % OiPk Apply to scaly spots once daily x 5 days (Patient not taking: Reported on 10/1/2024) 5 packet 3     No current facility-administered medications for this visit.       Alpha-1 Antitrypsin:  Last PFT:   Last CT:    Review of Systems  General: Feeling good.  Eyes: Vision is good with glassess  ENT:  No sinusitis or pharyngitis.   Heart:: No chest pain or palpitations.  Lungs: No cough, sputum, or wheezing.  GI: No Nausea, vomiting, constipation, diarrhea, or reflux.  : Nocturia x 1  Musculoskeletal: No joint pain or myalgias.  Skin: No lesions or rashes.  Neuro: No headaches or neuropathy.  Lymph: No edema or adenopathy.  Psych: No anxiety or depression.  Endo: No weight change.    OBJECTIVE:      /80 (BP Location: Right arm, Patient Position: Sitting)   Pulse 78   Ht 5' 11" (1.803 m)   Wt 86.2 kg (190 lb)   SpO2 95%   BMI 26.50 kg/m²     Physical Exam  GENERAL: Older patient in no distress.  HEENT: Pupils equal and reactive. Extraocular movements intact. Nose intact. Pharynx moist.  Mallampati 1  NECK: Supple.  16 " in  HEART: Regular rate and rhythm. No murmur or gallop auscultated.  LUNGS: Clear to auscultation and percussion. Lung excursion symmetrical. No change in fremitus. No adventitial noises.  ABDOMEN: Bowel sounds present. Non-tender, no masses palpated.  EXTREMITIES: Normal muscle tone and joint movement, no cyanosis or clubbing.   LYMPHATICS: No adenopathy palpated, no edema.  SKIN: Dry, intact, no lesions.   NEURO: Cranial nerves II-XII intact. Motor strength 5/5 bilaterally, upper and lower extremities.  PSYCH: Appropriate affect.  [place] Sleepiness scale 8  Assessment:       1. Obstructive sleep apnea        The patient has witnessed apneas.  He is moderately sleepy with an [place] Sleepiness scale of 8.  He does not think he will be able to tolerate CPAP.  He is seen CPAP and he is a side sleeper and a stomach sleeper.  Discussed possible mandibular advancement devices possible inspire devices if it is necessary.  Given how functional he is during the day he may just have mild obstructive sleep apnea and we can just watch it and avoid supine sleep.  Will see what his sleep study shows and then make decisions from that point.  Discussed CPAP maintenance with keeping it clean and using distilled water.  Discussed compliance.  Will see how severe his sleep apnea is.  Plan:       Obstructive sleep apnea         Follow up in about 2 months (around 12/1/2024).

## 2024-11-07 ENCOUNTER — PROCEDURE VISIT (OUTPATIENT)
Dept: SLEEP MEDICINE | Facility: HOSPITAL | Age: 59
End: 2024-11-07
Attending: INTERNAL MEDICINE
Payer: COMMERCIAL

## 2024-11-07 DIAGNOSIS — G47.33 OBSTRUCTIVE SLEEP APNEA: ICD-10-CM

## 2024-11-07 PROCEDURE — 95806 SLEEP STUDY UNATT&RESP EFFT: CPT

## 2024-11-11 ENCOUNTER — TELEPHONE (OUTPATIENT)
Dept: PULMONOLOGY | Facility: HOSPITAL | Age: 59
End: 2024-11-11

## 2024-11-11 DIAGNOSIS — G47.33 OBSTRUCTIVE SLEEP APNEA: Primary | ICD-10-CM

## 2024-11-11 NOTE — TELEPHONE ENCOUNTER
The patient's home sleep study shows severe obstructive sleep apnea.  Will order an auto Pap for him.  Called and left a message for him to call me back about this.

## 2024-12-11 ENCOUNTER — TELEPHONE (OUTPATIENT)
Dept: PULMONOLOGY | Facility: CLINIC | Age: 59
End: 2024-12-11
Payer: COMMERCIAL

## 2024-12-11 NOTE — TELEPHONE ENCOUNTER
Spoke with pts wife and pt is getting set up with CPAP tomorrow 12/12/24 so todays apt will be canceled and she will call to marly the follow up apt after he has had the machine for 30 days.

## 2025-01-12 ENCOUNTER — OFFICE VISIT (OUTPATIENT)
Dept: URGENT CARE | Facility: CLINIC | Age: 60
End: 2025-01-12
Payer: COMMERCIAL

## 2025-01-12 VITALS
TEMPERATURE: 99 F | OXYGEN SATURATION: 95 % | SYSTOLIC BLOOD PRESSURE: 133 MMHG | RESPIRATION RATE: 18 BRPM | DIASTOLIC BLOOD PRESSURE: 87 MMHG | BODY MASS INDEX: 26.6 KG/M2 | HEART RATE: 70 BPM | WEIGHT: 190 LBS | HEIGHT: 71 IN

## 2025-01-12 DIAGNOSIS — J10.1 INFLUENZA A: ICD-10-CM

## 2025-01-12 DIAGNOSIS — R05.9 COUGH, UNSPECIFIED TYPE: Primary | ICD-10-CM

## 2025-01-12 LAB
CTP QC/QA: YES
CTP QC/QA: YES
FLUAV AG NPH QL: POSITIVE
FLUBV AG NPH QL: NEGATIVE
SARS-COV-2 AG RESP QL IA.RAPID: NEGATIVE

## 2025-01-12 PROCEDURE — 87804 INFLUENZA ASSAY W/OPTIC: CPT | Mod: QW,,, | Performed by: NURSE PRACTITIONER

## 2025-01-12 PROCEDURE — 87811 SARS-COV-2 COVID19 W/OPTIC: CPT | Mod: QW,S$GLB,, | Performed by: NURSE PRACTITIONER

## 2025-01-12 PROCEDURE — 99214 OFFICE O/P EST MOD 30 MIN: CPT | Mod: S$GLB,,, | Performed by: NURSE PRACTITIONER

## 2025-01-12 RX ORDER — BALOXAVIR MARBOXIL 40 MG/1
80 TABLET, FILM COATED ORAL ONCE
Qty: 2 TABLET | Refills: 0 | Status: SHIPPED | OUTPATIENT
Start: 2025-01-12 | End: 2025-01-12

## 2025-01-12 NOTE — PROGRESS NOTES
"Subjective:      Patient ID: Domenico Ford is a 59 y.o. male.    Vitals:  height is 5' 11" (1.803 m) and weight is 86.2 kg (190 lb). His temperature is 98.6 °F (37 °C). His blood pressure is 133/87 and his pulse is 70. His respiration is 18 and oxygen saturation is 95%.     Chief Complaint: Cough    Pt wife has covid. And grandkids have the flu.     Cough  This is a new problem. The current episode started today. Associated symptoms include headaches, myalgias, nasal congestion, postnasal drip and rhinorrhea. Pertinent negatives include no fever.       Constitution: Negative for fever.   HENT:  Positive for postnasal drip.    Respiratory:  Positive for cough.    Musculoskeletal:  Positive for muscle ache.   Neurological:  Positive for headaches.      Objective:     Past Medical History:   Diagnosis Date    Hypertension     Stroke 2017    HEMMORGIC    Wears glasses        Past Surgical History:   Procedure Laterality Date    HERNIA REPAIR      [facility]    LAPAROSCOPIC CHOLECYSTECTOMY N/A 12/3/2020    Procedure: CHOLECYSTECTOMY, LAPAROSCOPIC;  Surgeon: Reed Moulton MD;  Location: [facility];  Service: General;  Laterality: N/A;       No family history on file.    Social History     Socioeconomic History    Marital status:    Tobacco Use    Smoking status: Never     Passive exposure: Past    Smokeless tobacco: Never   Substance and Sexual Activity    Alcohol use: Yes     Comment: OCC    Drug use: Never    Sexual activity: Yes     Partners: Female     Social Drivers of Health     Financial Resource Strain: Low Risk  (7/2/2024)    Overall Financial Resource Strain (CARDIA)     Difficulty of Paying Living Expenses: Not hard at all   Food Insecurity: No Food Insecurity (7/2/2024)    Hunger Vital Sign     Worried About Running Out of Food in the Last Year: Never true     Ran Out of Food in the Last Year: Never true   Physical Activity: Sufficiently Active (7/2/2024)    Exercise Vital Sign     Days of Exercise per " Week: 3 days     Minutes of Exercise per Session: 60 min   Stress: Stress Concern Present (7/2/2024)    [place] [employer] - Occupational Stress Questionnaire     Feeling of Stress : To some extent   Housing Stability: Unknown (7/2/2024)    Housing Stability Vital Sign     Unable to Pay for Housing in the Last Year: No       Current Outpatient Medications   Medication Sig Dispense Refill    atorvastatin (LIPITOR) 10 MG tablet TAKE 1 TABLET(10 MG) BY MOUTH EVERY EVENING 90 tablet 1    lisinopriL (PRINIVIL,ZESTRIL) 20 MG tablet TAKE 1 TABLET BY MOUTH EVERY MORNING 90 tablet 1    metoprolol succinate (TOPROL-XL) 50 MG 24 hr tablet Take 1 tablet (50 mg total) by mouth every evening. Due for annual with PCP, Labs 90 tablet 0    potassium chloride (KLOR-CON) 10 MEQ TbSR Take 1 tablet (10 mEq total) by mouth daily as needed. 90 tablet 1    sildenafil citrate (SILDENAFIL ORAL) CHEW 1 TO 2 TABLETS BY MOUTH 30 TO 45 MINUTES BEFORE SEXUAL ACTIVITY AS NEEDED      testosterone cypionate (DEPOTESTOTERONE CYPIONATE) 100 mg/mL injection Inject 100 mg into the muscle every 7 days.      baloxavir marboxiL (XOFLUZA) 40 mg tablet Take 2 tablets (80 mg total) by mouth once. for 1 dose 2 tablet 0    budesonide (PULMICORT) 0.5 mg/2 mL nebulizer solution Instill 1 vial into sinus rinse twice per day (Patient not taking: Reported on 1/12/2025) 360 mL 4    calcium carbonate/vitamin D3 (VITAMIN D-3 ORAL) Take by mouth once daily.      fluticasone propionate (FLOVENT HFA) 110 mcg/actuation inhaler 1 puff per nostril with speculum twice per day or 2 puffs per nostril daily. (Patient not taking: Reported on 1/12/2025) 12 g 3    levocetirizine (XYZAL) 5 MG tablet Take 1 tablet (5 mg total) by mouth 2 (two) times a day. 60 tablet 4    multivit with min-folic acid 0.4 mg Tab 1 tablet. (Patient not taking: Reported on 1/12/2025)      multivitamin capsule Take 1 capsule by mouth once daily. (Patient not taking: Reported on  1/12/2025)      tirbanibulin (KLISYRI) 1 % OiPk Apply to scaly spots once daily x 5 days (Patient not taking: Reported on 10/1/2024) 5 packet 3     No current facility-administered medications for this visit.       Review of patient's allergies indicates:   Allergen Reactions    Hydrocodone      Other reaction(s): Unknown    Hydrocodone-acetaminophen Other (See Comments)     SHAKING, DECREASED MOTOR SKILLS    Oxycodone-acetaminophen     Hydrocodone-ibuprofen Nausea And Vomiting       Physical Exam   Constitutional: He is oriented to person, place, and time.   HENT:   Head: Normocephalic.   Ears:   Right Ear: Tympanic membrane and ear canal normal.   Left Ear: Tympanic membrane and ear canal normal.   Mouth/Throat: Posterior oropharyngeal erythema present. No oropharyngeal exudate.   Eyes: Conjunctivae are normal.   Cardiovascular: Normal rate and regular rhythm.   Pulmonary/Chest: Effort normal and breath sounds normal.   Abdominal: Normal appearance.   Musculoskeletal: Normal range of motion.         General: Normal range of motion.   Neurological: He is alert and oriented to person, place, and time.   Skin: Skin is no rash.   Psychiatric: His behavior is normal. Mood, judgment and thought content normal.   Nursing note and vitals reviewed.      Assessment:     1. Cough, unspecified type    2. Influenza A        Plan:       Cough, unspecified type  -     SARS Coronavirus 2 Antigen, POCT Manual Read  -     POCT Influenza A/B Rapid Antigen    Influenza A    Other orders  -     baloxavir marboxiL (XOFLUZA) 40 mg tablet; Take 2 tablets (80 mg total) by mouth once. for 1 dose  Dispense: 2 tablet; Refill: 0    Pt presents with complaint of waking with myalgia and several ill contacts with infulenza and covid. He is Flu A +, covid negative at this time. No significant URI symptoms, advised to treat them symptomatically if they arise. Return precautions discussed.

## 2025-01-27 ENCOUNTER — OFFICE VISIT (OUTPATIENT)
Dept: PULMONOLOGY | Facility: CLINIC | Age: 60
End: 2025-01-27
Payer: COMMERCIAL

## 2025-01-27 VITALS
SYSTOLIC BLOOD PRESSURE: 128 MMHG | HEART RATE: 87 BPM | TEMPERATURE: 98 F | DIASTOLIC BLOOD PRESSURE: 62 MMHG | BODY MASS INDEX: 26.55 KG/M2 | HEIGHT: 71 IN | WEIGHT: 189.63 LBS | OXYGEN SATURATION: 95 %

## 2025-01-27 DIAGNOSIS — G47.33 OBSTRUCTIVE SLEEP APNEA: Primary | ICD-10-CM

## 2025-01-27 PROCEDURE — 3074F SYST BP LT 130 MM HG: CPT | Mod: CPTII,S$GLB,, | Performed by: INTERNAL MEDICINE

## 2025-01-27 PROCEDURE — 4010F ACE/ARB THERAPY RXD/TAKEN: CPT | Mod: CPTII,S$GLB,, | Performed by: INTERNAL MEDICINE

## 2025-01-27 PROCEDURE — 1159F MED LIST DOCD IN RCRD: CPT | Mod: CPTII,S$GLB,, | Performed by: INTERNAL MEDICINE

## 2025-01-27 PROCEDURE — 3078F DIAST BP <80 MM HG: CPT | Mod: CPTII,S$GLB,, | Performed by: INTERNAL MEDICINE

## 2025-01-27 PROCEDURE — 99999 PR PBB SHADOW E&M-EST. PATIENT-LVL IV: CPT | Mod: PBBFAC,,, | Performed by: INTERNAL MEDICINE

## 2025-01-27 PROCEDURE — 99213 OFFICE O/P EST LOW 20 MIN: CPT | Mod: S$GLB,,, | Performed by: INTERNAL MEDICINE

## 2025-01-27 PROCEDURE — 1160F RVW MEDS BY RX/DR IN RCRD: CPT | Mod: CPTII,S$GLB,, | Performed by: INTERNAL MEDICINE

## 2025-01-27 PROCEDURE — 3008F BODY MASS INDEX DOCD: CPT | Mod: CPTII,S$GLB,, | Performed by: INTERNAL MEDICINE

## 2025-01-27 NOTE — PROGRESS NOTES
SUBJECTIVE:    Patient ID: Domenico Ford is a 59 y.o. male.    Chief Complaint: Follow-up (3 month follow up DAIANA)    Follow-up     The patient is hereafter being found to have severe DAIANA.  He is now on an autopap. He is having a fair amount of leak with his current F40 Airfit mask. He is sleeping on it for 7 hrs and 2 minutes.  His max pressure is 18.6 with a median of 11.6.  His AHI is 6.9 He is noticing more alertness during the day.  Past Medical History:   Diagnosis Date    Hypertension     Stroke 2017    HEMMORGIC    Wears glasses      Past Surgical History:   Procedure Laterality Date    HERNIA REPAIR      [facility]    LAPAROSCOPIC CHOLECYSTECTOMY N/A 12/3/2020    Procedure: CHOLECYSTECTOMY, LAPAROSCOPIC;  Surgeon: Reed Moulton MD;  Location: [facility];  Service: General;  Laterality: N/A;     No family history on file.     Social History:   Marital Status:   Occupation: supervisor of a machine shop for [employer]  Alcohol History:  reports current alcohol use. 1/ weekend  Tobacco History:  reports that he has never smoked. He has been exposed to tobacco smoke. He has never used smokeless tobacco.  Drug History:  reports no history of drug use.    Review of patient's allergies indicates:   Allergen Reactions    Hydrocodone      Other reaction(s): Unknown    Hydrocodone-acetaminophen Other (See Comments)     SHAKING, DECREASED MOTOR SKILLS    Oxycodone-acetaminophen     Hydrocodone-ibuprofen Nausea And Vomiting       Current Outpatient Medications   Medication Sig Dispense Refill    atorvastatin (LIPITOR) 10 MG tablet TAKE 1 TABLET(10 MG) BY MOUTH EVERY EVENING 90 tablet 1    lisinopriL (PRINIVIL,ZESTRIL) 20 MG tablet TAKE 1 TABLET BY MOUTH EVERY MORNING 90 tablet 1    metoprolol succinate (TOPROL-XL) 50 MG 24 hr tablet Take 1 tablet (50 mg total) by mouth every evening. Due for annual with PCP, Labs 90 tablet 0    multivitamin capsule Take 1 capsule by mouth once daily.      potassium chloride  "(KLOR-CON) 10 MEQ TbSR Take 1 tablet (10 mEq total) by mouth daily as needed. 90 tablet 1    sildenafil citrate (SILDENAFIL ORAL) CHEW 1 TO 2 TABLETS BY MOUTH 30 TO 45 MINUTES BEFORE SEXUAL ACTIVITY AS NEEDED      testosterone cypionate (DEPOTESTOTERONE CYPIONATE) 100 mg/mL injection Inject 100 mg into the muscle every 7 days.      budesonide (PULMICORT) 0.5 mg/2 mL nebulizer solution Instill 1 vial into sinus rinse twice per day (Patient not taking: Reported on 1/27/2025) 360 mL 4    calcium carbonate/vitamin D3 (VITAMIN D-3 ORAL) Take by mouth once daily.      fluticasone propionate (FLOVENT HFA) 110 mcg/actuation inhaler 1 puff per nostril with speculum twice per day or 2 puffs per nostril daily. (Patient not taking: Reported on 1/27/2025) 12 g 3    levocetirizine (XYZAL) 5 MG tablet Take 1 tablet (5 mg total) by mouth 2 (two) times a day. 60 tablet 4    multivit with min-folic acid 0.4 mg Tab 1 tablet. (Patient not taking: Reported on 1/27/2025)      tirbanibulin (KLISYRI) 1 % OiPk Apply to scaly spots once daily x 5 days (Patient not taking: Reported on 10/1/2024) 5 packet 3     No current facility-administered medications for this visit.       Alpha-1 Antitrypsin:  Last PFT:   Last CT:    Review of Systems  General: Feeling good.  Eyes: Vision is good with glassess  ENT:  No sinusitis or pharyngitis.   Heart:: No chest pain or palpitations.  Lungs: No cough, sputum, or wheezing.  GI: No Nausea, vomiting, constipation, diarrhea, or reflux.  : Nocturia x 1  Musculoskeletal: No joint pain or myalgias.  Skin: No lesions or rashes.  Neuro: No headaches or neuropathy.  Lymph: No edema or adenopathy.  Psych: No anxiety or depression.  Endo: No weight change.    OBJECTIVE:      /62 (BP Location: Right arm, Patient Position: Sitting)   Pulse 87   Temp 97.8 °F (36.6 °C)   Ht 5' 11" (1.803 m)   Wt 86 kg (189 lb 9.6 oz)   SpO2 95%   BMI 26.44 kg/m²     Physical Exam  GENERAL: Older patient in no " distress.  HEENT: Pupils equal and reactive. Extraocular movements intact. Nose intact. Pharynx moist.  Mallampati 1  NECK: Supple.  16 in  HEART: Regular rate and rhythm. No murmur or gallop auscultated.  LUNGS: Clear to auscultation and percussion. Lung excursion symmetrical. No change in fremitus. No adventitial noises.  ABDOMEN: Bowel sounds present. Non-tender, no masses palpated.  EXTREMITIES: Normal muscle tone and joint movement, no cyanosis or clubbing.   LYMPHATICS: No adenopathy palpated, no edema.  SKIN: Dry, intact, no lesions.   NEURO: Cranial nerves II-XII intact. Motor strength 5/5 bilaterally, upper and lower extremities.  PSYCH: Appropriate affect.  [place] Sleepiness scale 8  Assessment:       1. Obstructive sleep apnea          The patient is doing well with his autoPAP.  He is having some mask leak.  Suggested that he call [facility] and get a new mask.  Reminded him that there are many mask.  We should be able to find 1 that works for him.  Plan:       Obstructive sleep apnea           No follow-ups on file.  Call [facility] for a new mask  Continue wearing her CPAP nightly.  Keep it clean, distilled water.

## 2025-04-03 ENCOUNTER — HOSPITAL ENCOUNTER (EMERGENCY)
Facility: HOSPITAL | Age: 60
Discharge: HOME | End: 2025-04-03
Attending: EMERGENCY MEDICINE
Payer: COMMERCIAL

## 2025-04-03 VITALS
HEIGHT: 71 IN | BODY MASS INDEX: 25.9 KG/M2 | HEART RATE: 69 BPM | DIASTOLIC BLOOD PRESSURE: 85 MMHG | WEIGHT: 185 LBS | SYSTOLIC BLOOD PRESSURE: 129 MMHG | OXYGEN SATURATION: 96 % | RESPIRATION RATE: 17 BRPM | TEMPERATURE: 98 F

## 2025-04-03 DIAGNOSIS — T16.1XXA EAR FOREIGN BODY, RIGHT, INITIAL ENCOUNTER: Primary | ICD-10-CM

## 2025-04-03 PROCEDURE — 99282 EMERGENCY DEPT VISIT SF MDM: CPT

## 2025-04-03 NOTE — PLAN OF CARE
Appt made with ENT for 04/05/2025 at 10:15 with Dr. Jesse Osborne.    04/03/25 1751   Post-Acute Status   Post-Acute Authorization Other   Hospital Resources/Appts/Education Provided Appointments scheduled and added to AVS

## 2025-04-03 NOTE — ED NOTES
Bed: [facility] 02 - B Chair  Expected date:   Expected time:   Means of arrival:   Comments:

## 2025-04-03 NOTE — DISCHARGE INSTRUCTIONS
Please follow up with ENT as scheduled.  Your appointment at the attached to this discharge paperwork.  Return to the emergency department if your symptoms worsen prior to you being seen.

## 2025-04-03 NOTE — ED NOTES
Bed: [facility] 02 - C Chair  Expected date:   Expected time:   Means of arrival:   Comments:

## 2025-04-03 NOTE — Clinical Note
"Domenico"Daniel Ford was seen and treated in our emergency department on 4/3/2025.  He may return to work on 04/05/2025.       If you have any questions or concerns, please don't hesitate to call.      Alejandra Phelan PA-C"

## 2025-04-04 ENCOUNTER — OFFICE VISIT (OUTPATIENT)
Dept: OTOLARYNGOLOGY | Facility: CLINIC | Age: 60
End: 2025-04-04
Payer: COMMERCIAL

## 2025-04-04 VITALS — WEIGHT: 183.19 LBS | BODY MASS INDEX: 25.55 KG/M2

## 2025-04-04 DIAGNOSIS — T16.1XXA EAR FOREIGN BODY, RIGHT, INITIAL ENCOUNTER: ICD-10-CM

## 2025-04-04 PROCEDURE — 99999 PR PBB SHADOW E&M-EST. PATIENT-LVL III: CPT | Mod: PBBFAC,,, | Performed by: OTOLARYNGOLOGY

## 2025-04-04 RX ORDER — METFORMIN HYDROCHLORIDE 500 MG/1
TABLET ORAL
COMMUNITY
Start: 2025-02-14

## 2025-04-04 NOTE — PROGRESS NOTES
Subjective:       Patient ID: Domenico Ford is a 60 y.o. male.    Chief Complaint: Foreign Body in Ear (Patient states a piece a  of metal was found in the right ear yesterday at his work physical. 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  You really did not have any symptoms for this he is just addressing what was noted during this routine screening audiogram.          Objective:      ENT Physical Exam    So his left tympanic membrane and ear canal look fine     On the right side the inferior tympanic membrane against the annulus there has a 0.2 mm (estimated) shiny perfectly round metallic fragment touching the eardrum.  There some redness of the eardrum in other locations presumably from recent cleaning efforts.    A 3. Suctioned vacuumed this tiny foreign body away     His nasal exam shows a slight to moderate rightward septal deviation in his oropharynx is normal            Assessment:       1. Ear foreign body, right, initial encounter         Plan:          So while I can not imagine this microscopic almost piece of metal causing any problems it has been removed today in the office using a microscope and suction

## 2025-04-04 NOTE — ED PROVIDER NOTES
Encounter Date: 4/3/2025       History     Chief Complaint   Patient presents with    Foreign Body in Ear     Patient was having an audiogram today and they noted a piece of metal in R ear.  He was told to come to the ED to have it flushed out.     59-year-old male presents to the emergency department for a foreign body in his right ear.  Patient states he was having a yearly exam and they typically do audiogram.  Noticed on physical exam that patient had a piece of metal stuck in his ear.  The nurse practitioner at the time attempted to remove the foreign body with multiple flushes of saline as well as a curette.  Patient reports that she was unsuccessful and suggested he come to the emergency department.  He is denying any symptoms such as pain, hearing loss, tinnitus, headache, dizziness, lightheadedness.        Review of patient's allergies indicates:   Allergen Reactions    Hydrocodone      Other reaction(s): Unknown    Hydrocodone-acetaminophen Other (See Comments)     SHAKING, DECREASED MOTOR SKILLS    Oxycodone-acetaminophen     Hydrocodone-ibuprofen Nausea And Vomiting     Past Medical History:   Diagnosis Date    Hypertension     Stroke 2017    HEMMORGIC    Wears glasses      Past Surgical History:   Procedure Laterality Date    HERNIA REPAIR      [facility]    LAPAROSCOPIC CHOLECYSTECTOMY N/A 12/3/2020    Procedure: CHOLECYSTECTOMY, LAPAROSCOPIC;  Surgeon: Reed Moulton MD;  Location: [facility];  Service: General;  Laterality: N/A;     No family history on file.  Social History[1]  Review of Systems   Constitutional: Negative.    HENT: Negative.     Respiratory: Negative.     Cardiovascular: Negative.        Physical Exam     Initial Vitals [04/03/25 1645]   BP Pulse Resp Temp SpO2   133/75 80 14 98.2 °F (36.8 °C) (!) 94 %      MAP       --         Physical Exam    Vitals reviewed.  Constitutional: He appears well-developed and well-nourished. He is not diaphoretic. No distress.   HENT:   Left Ear: External  ear normal. Mouth/Throat: Oropharynx is clear and moist.   Small round metal foreign body noted in the right ear canal appears to be slightly imbedded in the TM.  Area of blood noted to the TM just lateral to the foreign body.  Appears to be recent.  Maybe irritability from previous attempt at removal   Eyes: Conjunctivae and EOM are normal. Pupils are equal, round, and reactive to light. Right eye exhibits no discharge. Left eye exhibits no discharge.   Neck: Neck supple.   Normal range of motion.  Cardiovascular:  Normal rate, regular rhythm, normal heart sounds and intact distal pulses.           Pulmonary/Chest: Breath sounds normal. No respiratory distress.   Abdominal: Abdomen is soft. He exhibits no distension.   Musculoskeletal:         General: Normal range of motion.      Cervical back: Normal range of motion and neck supple.     Neurological: He is alert. GCS score is 15. GCS eye subscore is 4. GCS verbal subscore is 5. GCS motor subscore is 6.   Skin: Skin is warm. Capillary refill takes less than 2 seconds.         ED Course   Procedures  Labs Reviewed - No data to display       Imaging Results    None          Medications - No data to display  Medical Decision Making  59-year-old male presents to emergency department for a foreign body in his right ear    Considerations include but not limited to foreign body, otitis media, otitis externa, TM perforation, injury to the TM    Vitals stable.  Patient afebrile.  Well-appearing on physical exam.  Nontoxic and in no acute distress.  On physical exam small round foreign body noted in the right ear canal appears to be slightly imbedded in the TM.  Area blood noted to the TM just lateral to the foreign body.  Appears to be recent.  Could be irritability from previous attempt at removal.  Patient is not having any hearing loss, ringing in the ears, pain, headache, dizziness.  Given the location of the foreign body and the fact that appears to be embedded I will  not attempt to remove here in the emergency department.  I was able to have social work schedule an appointment with ENT for the patient tomorrow morning.  Informed the patient of the appointment and that he must go to it.  We also discussed strict return precautions regarding worsening symptoms prior to his appointment.  He verbalized understanding and agreed.  All questions were answered at the bedside.                                      Clinical Impression:  Final diagnoses:  [T16.1XXA] Ear foreign body, right, initial encounter (Primary)          ED Disposition Condition    Discharge Stable          ED Prescriptions    None       Follow-up Information       Follow up With Specialties Details Why Contact Info    Kaushik Silver MD Family Medicine Call   [address] [address]  [phone]      [facility] Otolaryngology Call   [address]  [place] [address] [phone]    Jesse Ferrell MD Otolaryngology Go on 4/4/2025 Pt is scheduled for 04/05/2025 at 10:15am. [address]  [address]  [place] [facility] [id number] [phone]                 [1]   Social History  Tobacco Use    Smoking status: Never     Passive exposure: Past    Smokeless tobacco: Never   Substance Use Topics    Alcohol use: Yes     Comment: OCC    Drug use: Never        Alejandra Phelan PA-C  04/03/25 [id number]

## 2025-07-29 ENCOUNTER — OFFICE VISIT (OUTPATIENT)
Dept: PULMONOLOGY | Facility: CLINIC | Age: 60
End: 2025-07-29
Payer: COMMERCIAL

## 2025-07-29 VITALS
WEIGHT: 184.81 LBS | DIASTOLIC BLOOD PRESSURE: 62 MMHG | HEIGHT: 71 IN | SYSTOLIC BLOOD PRESSURE: 118 MMHG | OXYGEN SATURATION: 95 % | HEART RATE: 71 BPM | BODY MASS INDEX: 25.87 KG/M2

## 2025-07-29 DIAGNOSIS — G47.33 OBSTRUCTIVE SLEEP APNEA: Primary | ICD-10-CM

## 2025-07-29 PROCEDURE — 4010F ACE/ARB THERAPY RXD/TAKEN: CPT | Mod: CPTII,S$GLB,, | Performed by: INTERNAL MEDICINE

## 2025-07-29 PROCEDURE — 99213 OFFICE O/P EST LOW 20 MIN: CPT | Mod: S$GLB,,, | Performed by: INTERNAL MEDICINE

## 2025-07-29 PROCEDURE — 3074F SYST BP LT 130 MM HG: CPT | Mod: CPTII,S$GLB,, | Performed by: INTERNAL MEDICINE

## 2025-07-29 PROCEDURE — 1159F MED LIST DOCD IN RCRD: CPT | Mod: CPTII,S$GLB,, | Performed by: INTERNAL MEDICINE

## 2025-07-29 PROCEDURE — 3008F BODY MASS INDEX DOCD: CPT | Mod: CPTII,S$GLB,, | Performed by: INTERNAL MEDICINE

## 2025-07-29 PROCEDURE — 3078F DIAST BP <80 MM HG: CPT | Mod: CPTII,S$GLB,, | Performed by: INTERNAL MEDICINE

## 2025-07-29 PROCEDURE — 99999 PR PBB SHADOW E&M-EST. PATIENT-LVL III: CPT | Mod: PBBFAC,,, | Performed by: INTERNAL MEDICINE

## 2025-07-29 NOTE — PROGRESS NOTES
SUBJECTIVE:    Patient ID: Domenico Ford is a 60 y.o. male.    Chief Complaint: Follow-up (6 month follow up DAIANA)    Follow-up     The patient is hereafter being found to have severe DAIANA.  He is now on an autopap.new mask which doesn't leak as much. He has a  He is sleeping on it for 6 hrs and 26 minutes.  His max pressure is 19.1 with a median of 12.4.  His AHI is 6.6  He is feeling good.  Past Medical History:   Diagnosis Date    Hypertension     Stroke 2017    HEMMORGIC    Wears glasses      Past Surgical History:   Procedure Laterality Date    HERNIA REPAIR      [facility]    LAPAROSCOPIC CHOLECYSTECTOMY N/A 12/3/2020    Procedure: CHOLECYSTECTOMY, LAPAROSCOPIC;  Surgeon: Reed Moulton MD;  Location: [facility];  Service: General;  Laterality: N/A;     No family history on file.     Social History:   Marital Status:   Occupation: supervisor of a machine shop for [employer]  Alcohol History:  reports current alcohol use. 1/ weekend  Tobacco History:  reports that he has never smoked. He has been exposed to tobacco smoke. He has never used smokeless tobacco.  Drug History:  reports no history of drug use.    Review of patient's allergies indicates:   Allergen Reactions    Hydrocodone      Other reaction(s): Unknown    Hydrocodone-acetaminophen Other (See Comments)     SHAKING, DECREASED MOTOR SKILLS    Oxycodone-acetaminophen     Hydrocodone-ibuprofen Nausea And Vomiting       Current Outpatient Medications   Medication Sig Dispense Refill    atorvastatin (LIPITOR) 10 MG tablet TAKE 1 TABLET(10 MG) BY MOUTH EVERY EVENING 90 tablet 1    lisinopriL (PRINIVIL,ZESTRIL) 20 MG tablet TAKE 1 TABLET BY MOUTH EVERY MORNING 90 tablet 1    metFORMIN (GLUCOPHAGE) 500 MG tablet Take by mouth.      metoprolol succinate (TOPROL-XL) 50 MG 24 hr tablet Take 1 tablet (50 mg total) by mouth every evening. Due for annual with PCP, Labs 90 tablet 0    multivit with min-folic acid 0.4 mg Tab 1 tablet.      potassium chloride  "(KLOR-CON) 10 MEQ TbSR Take 1 tablet (10 mEq total) by mouth daily as needed. 90 tablet 1    sildenafil citrate (SILDENAFIL ORAL) CHEW 1 TO 2 TABLETS BY MOUTH 30 TO 45 MINUTES BEFORE SEXUAL ACTIVITY AS NEEDED      testosterone cypionate (DEPOTESTOTERONE CYPIONATE) 100 mg/mL injection Inject 100 mg into the muscle every 7 days.      budesonide (PULMICORT) 0.5 mg/2 mL nebulizer solution Instill 1 vial into sinus rinse twice per day (Patient not taking: Reported on 1/27/2025) 360 mL 4    fluticasone propionate (FLOVENT HFA) 110 mcg/actuation inhaler 1 puff per nostril with speculum twice per day or 2 puffs per nostril daily. (Patient not taking: Reported on 7/29/2025) 12 g 3    multivitamin capsule Take 1 capsule by mouth once daily.       No current facility-administered medications for this visit.       Alpha-1 Antitrypsin:  Last PFT:   Last CT:    Review of Systems  General: Feeling good.  Eyes: Vision is good with glassess  ENT:  No sinusitis or pharyngitis.   Heart:: No chest pain or palpitations.  Lungs: No cough, sputum, or wheezing.  GI: No Nausea, vomiting, constipation, diarrhea, or reflux.  : Nocturia x 1  Musculoskeletal: No joint pain or myalgias.  Skin: No lesions or rashes.  Neuro: No headaches or neuropathy.  Lymph: No edema or adenopathy.  Psych: No anxiety or depression.  Endo: No weight change.    OBJECTIVE:      /62   Pulse 71   Ht 5' 11" (1.803 m)   Wt 83.8 kg (184 lb 12.8 oz)   SpO2 95%   BMI 25.77 kg/m²     Physical Exam  GENERAL: Midged patient in no distress.  HEENT: Pupils equal and reactive. Extraocular movements intact. Nose intact. Pharynx moist.  Mallampati 1  NECK: Supple.  16 in  HEART: Regular rate and rhythm. No murmur or gallop auscultated.  LUNGS: Clear to auscultation and percussion. Lung excursion symmetrical. No change in fremitus. No adventitial noises.  ABDOMEN: Bowel sounds present. Non-tender, no masses palpated.  EXTREMITIES: Normal muscle tone and joint " movement, no cyanosis or clubbing.   LYMPHATICS: No adenopathy palpated, no edema.  SKIN: Dry, intact, no lesions.   NEURO: Cranial nerves II-XII intact. Motor strength 5/5 bilaterally, upper and lower extremities.  PSYCH: Appropriate affect.    Assessment:       1. Obstructive sleep apnea            The patient is doing well with his autoPAP.  He is having less mask leak with his new mask..    Plan:       Obstructive sleep apnea             Follow up in about 1 year (around 7/29/2026).    Continue wearing her CPAP nightly.  Keep it clean, distilled water.  Call if you need anything.

## 2025-08-04 ENCOUNTER — OFFICE VISIT (OUTPATIENT)
Dept: UROLOGY | Facility: CLINIC | Age: 60
End: 2025-08-04
Payer: COMMERCIAL

## 2025-08-04 VITALS — HEIGHT: 71 IN | WEIGHT: 184.75 LBS | BODY MASS INDEX: 25.86 KG/M2

## 2025-08-04 DIAGNOSIS — N40.1 BPH WITH OBSTRUCTION/LOWER URINARY TRACT SYMPTOMS: Primary | ICD-10-CM

## 2025-08-04 DIAGNOSIS — N13.8 BPH WITH OBSTRUCTION/LOWER URINARY TRACT SYMPTOMS: Primary | ICD-10-CM

## 2025-08-04 DIAGNOSIS — Z12.5 SCREENING FOR PROSTATE CANCER: ICD-10-CM

## 2025-08-04 LAB — POC RESIDUAL URINE VOLUME: 32 ML (ref 0–100)

## 2025-08-04 PROCEDURE — 1159F MED LIST DOCD IN RCRD: CPT | Mod: CPTII,S$GLB,, | Performed by: NURSE PRACTITIONER

## 2025-08-04 PROCEDURE — 99999 PR PBB SHADOW E&M-EST. PATIENT-LVL III: CPT | Mod: PBBFAC,,, | Performed by: NURSE PRACTITIONER

## 2025-08-04 PROCEDURE — 4010F ACE/ARB THERAPY RXD/TAKEN: CPT | Mod: CPTII,S$GLB,, | Performed by: NURSE PRACTITIONER

## 2025-08-04 PROCEDURE — 1160F RVW MEDS BY RX/DR IN RCRD: CPT | Mod: CPTII,S$GLB,, | Performed by: NURSE PRACTITIONER

## 2025-08-04 PROCEDURE — 51798 US URINE CAPACITY MEASURE: CPT | Mod: S$GLB,,, | Performed by: NURSE PRACTITIONER

## 2025-08-04 PROCEDURE — 3008F BODY MASS INDEX DOCD: CPT | Mod: CPTII,S$GLB,, | Performed by: NURSE PRACTITIONER

## 2025-08-04 PROCEDURE — 99214 OFFICE O/P EST MOD 30 MIN: CPT | Mod: S$GLB,,, | Performed by: NURSE PRACTITIONER

## 2025-08-04 NOTE — PROGRESS NOTES
[facility]  Group: Dean/Zion/Pipe/Jamel  NPs: Kamini Garcia/Ange Pal    Note today written by:Ange Pal  Date of Service: 08/04/2025    CHIEF COMPLAINT: Prostate check    PRESENTING ILLNESS:    Domenico Ford is a 60 y.o. male who presents for prostate check. Last clinic visit was 7/5/24 with Dr Mccullough    Patient with a history of HTN and stroke who was referred by a men's health clinic for rising PSA. He is on Testosterone 100 mg every week per his clinic in [place] since 2017. Also saw Dr. Macario in the past with Androgel.      He underwent PSA testing and it was up to 3.62 at [facility] in 6/2024. Patient states that his PSA was approximately 1 one year prior (no record available).      Grandfather and maternal uncle with prostate cancer. Mother with lung cancer.      He has no significant lower urinary tract symptoms. He does have moderate erectile dysfunction managed with Cialis and Viagra as needed.      Works as a .      Denies any fever, chills, gross hematuria, flank pain, bone pain, unintentional weight loss,  trauma or personal history of  malignancy.     8/4/25: pt presents for prostate check  No bothersome voiding complaints  Good urinary stream  Denies dysuria, gross hematuria or flank pain  PVR 32 ml  Pt is taking cialis 5 mg daily for BPH and ED, works well for him.    On TRT, testosterone injections 100 mg every week, followed by [facility].  Needs updated PSA     PSA  3.3  9/5/24  3.62*                6/10/24     Testosterone  666*                 6/10/24     A1C  5.9*                  6/10/24     *[facility]      REVIEW OF SYSTEMS: as stated above in hpi    PATIENT HISTORY:    Past Medical History:   Diagnosis Date    Hypertension     Stroke 2017    HEMMORGIC    Wears glasses        Past Surgical History:   Procedure Laterality Date    HERNIA REPAIR      [facility]    LAPAROSCOPIC  CHOLECYSTECTOMY N/A 12/3/2020    Procedure: CHOLECYSTECTOMY, LAPAROSCOPIC;  Surgeon: Reed Moulton MD;  [facility]: [facility];  Service: General;  Laterality: N/A;         PHYSICAL EXAMINATION:  Constitutional: He is oriented to person, place, and time. He appears well-developed and well-nourished.  He is in no apparent distress.  Abdominal:  He exhibits no distension.  There is no CVA tenderness.   Neurological: He is alert and oriented to person, place, and time.   Psych: Cooperative with normal affect.  Genitourinary: The prostate is ~30-35 g.  Prostate is smooth, non tender with no nodules noted.    Physical Exam      LABS:      Lab Results   Component Value Date    PSADIAG 3.3 09/05/2024     Lab Results   Component Value Date    CREATININE 1.4 11/30/2020         IMPRESSION:    Encounter Diagnoses   Name Primary?    BPH with obstruction/lower urinary tract symptoms Yes    Screening for prostate cancer        PLAN:  -PSA ordered to complete at [facility]. Will call with results. Recommend PSA every 6 months while on TRT    -Discussed BPH/LUTS. Pt is aware TRT can increase growth of prostate. Pt is pleased with how he voids. Continue cialis 5 mg daily, no refills needed    -Continue to follow [facility] for TRT    -RTC 1 year if PSA stable    I encouraged him or any of his family members to call or email me with questions and/or concerns.      30 minutes of total time spent on the encounter, which includes face to face time and non-face to face time preparing to see the patient (eg, review of tests), Obtaining and/or reviewing separately obtained history, Documenting clinical information in the electronic or other health record, Independently interpreting results (not separately reported) and communicating results to the patient/family/caregiver, or Care coordination (not separately reported).

## 2025-08-08 ENCOUNTER — OFFICE VISIT (OUTPATIENT)
Dept: URGENT CARE | Facility: CLINIC | Age: 60
End: 2025-08-08
Payer: COMMERCIAL

## 2025-08-08 VITALS
HEIGHT: 71 IN | BODY MASS INDEX: 25.76 KG/M2 | SYSTOLIC BLOOD PRESSURE: 122 MMHG | HEART RATE: 71 BPM | WEIGHT: 184 LBS | OXYGEN SATURATION: 97 % | RESPIRATION RATE: 16 BRPM | TEMPERATURE: 99 F | DIASTOLIC BLOOD PRESSURE: 78 MMHG

## 2025-08-08 DIAGNOSIS — D36.10 NEUROMA: ICD-10-CM

## 2025-08-08 DIAGNOSIS — M79.672 LEFT FOOT PAIN: Primary | ICD-10-CM

## 2025-08-08 PROBLEM — I63.9 CEREBROVASCULAR ACCIDENT: Status: ACTIVE | Noted: 2025-08-08

## 2025-08-08 PROBLEM — K40.90 NON-RECURRENT UNILATERAL INGUINAL HERNIA WITHOUT OBSTRUCTION OR GANGRENE: Status: ACTIVE | Noted: 2024-08-12

## 2025-08-08 PROBLEM — I10 HYPERTENSION: Status: ACTIVE | Noted: 2025-08-08

## 2025-08-08 PROBLEM — M54.30 SCIATICA, UNSPECIFIED SIDE: Status: ACTIVE | Noted: 2025-08-08

## 2025-08-08 NOTE — PROGRESS NOTES
"Subjective:      Patient ID: Domenico Ford is a 60 y.o. male.    Vitals:  height is 5' 11" (1.803 m) and weight is 83.5 kg (184 lb). His oral temperature is 98.7 °F (37.1 °C). His blood pressure is 122/78 and his pulse is 71. His respiration is 16 and oxygen saturation is 97%.     Chief Complaint: Foot Pain (Left)    Onset of symptoms 2+ weeks, pain to ball of left foot radiating to 2nd and 3rd toes worse with weight-bearing relieved with rest and no weight-bearing.  Denies injury or trauma, denies known precipitating event or occurrence.  Denies history of similar occurrences in the past    Foot Pain  This is a new problem. Episode onset: x 2 weeks. The problem has been gradually worsening. Associated symptoms include joint swelling (left second toe was swollen, resolved). He has tried NSAIDs for the symptoms. The treatment provided mild relief.       Musculoskeletal:  Positive for pain (left foot, radiates to second and third toes), trauma, joint swelling (left second toe was swollen, resolved) and pain with walking.   Skin:  Negative for wound, skin thickening/induration, erythema and bruising.      Objective:     Physical Exam   Constitutional: He is oriented to person, place, and time.  Non-toxic appearance. He does not appear ill. No distress.   HENT:   Head: Normocephalic and atraumatic.   Nose: Nose normal.   Eyes: Conjunctivae are normal.   Cardiovascular: Normal rate.   Pulmonary/Chest: Effort normal. No respiratory distress.   Abdominal: Normal appearance.   Musculoskeletal: Normal range of motion.         General: Tenderness present. No swelling, deformity or signs of injury. Normal range of motion.        Feet:    Neurological: no focal deficit. He is alert and oriented to person, place, and time.   Skin: Skin is warm, dry, not diaphoretic and no rash. Capillary refill takes 2 to 3 seconds. No bruising, No erythema and No lesion   Psychiatric: His behavior is normal. Mood normal.   Nursing note and " vitals reviewed.      Assessment:     1. Left foot pain    2. Neuroma        Plan:       Left foot pain  -     Ambulatory referral/consult to Podiatry    Neuroma  -     Ambulatory referral/consult to Podiatry                Pt sx's and exam most consistent with left foot neuroma. Discussed rest, ice, shoe insert, elevate, limit activity, nsaids. Discussed systemic steroids and possible side effects, effectiveness of medication orally as opposed to deferring to specialist. Desires specialist eval and mgmt.  Referral to podiatry placed.

## 2025-08-11 ENCOUNTER — TELEPHONE (OUTPATIENT)
Dept: URGENT CARE | Facility: CLINIC | Age: 60
End: 2025-08-11
Payer: COMMERCIAL

## 2025-08-18 ENCOUNTER — OFFICE VISIT (OUTPATIENT)
Dept: PODIATRY | Facility: CLINIC | Age: 60
End: 2025-08-18
Payer: COMMERCIAL

## 2025-08-18 VITALS — HEART RATE: 74 BPM | WEIGHT: 183.88 LBS | HEIGHT: 71 IN | OXYGEN SATURATION: 99 % | BODY MASS INDEX: 25.74 KG/M2

## 2025-08-18 DIAGNOSIS — D36.10 NEUROMA: Primary | ICD-10-CM

## 2025-08-18 DIAGNOSIS — M79.672 LEFT FOOT PAIN: ICD-10-CM

## 2025-08-18 PROCEDURE — 99999 PR PBB SHADOW E&M-EST. PATIENT-LVL III: CPT | Mod: PBBFAC,,, | Performed by: PODIATRIST

## 2025-08-19 RX ORDER — MELOXICAM 15 MG/1
15 TABLET ORAL DAILY
Qty: 30 TABLET | Refills: 3 | Status: SHIPPED | OUTPATIENT
Start: 2025-08-19

## 2025-08-19 RX ORDER — BUPIVACAINE HYDROCHLORIDE 5 MG/ML
1.5 INJECTION, SOLUTION PERINEURAL
Status: COMPLETED | OUTPATIENT
Start: 2025-08-19 | End: 2025-08-19

## 2025-08-19 RX ORDER — METHYLPREDNISOLONE ACETATE 40 MG/ML
20 INJECTION, SUSPENSION INTRA-ARTICULAR; INTRALESIONAL; INTRAMUSCULAR; SOFT TISSUE
Status: COMPLETED | OUTPATIENT
Start: 2025-08-19 | End: 2025-08-19

## 2025-08-19 RX ORDER — DEXAMETHASONE SODIUM PHOSPHATE 4 MG/ML
4 INJECTION, SOLUTION INTRA-ARTICULAR; INTRALESIONAL; INTRAMUSCULAR; INTRAVENOUS; SOFT TISSUE
Status: COMPLETED | OUTPATIENT
Start: 2025-08-19 | End: 2025-08-19

## 2025-08-19 RX ADMIN — BUPIVACAINE HYDROCHLORIDE 7.5 MG: 5 INJECTION, SOLUTION PERINEURAL at 08:08

## 2025-08-19 RX ADMIN — METHYLPREDNISOLONE ACETATE 20 MG: 40 INJECTION, SUSPENSION INTRA-ARTICULAR; INTRALESIONAL; INTRAMUSCULAR; SOFT TISSUE at 08:08

## 2025-08-19 RX ADMIN — DEXAMETHASONE SODIUM PHOSPHATE 4 MG: 4 INJECTION, SOLUTION INTRA-ARTICULAR; INTRALESIONAL; INTRAMUSCULAR; INTRAVENOUS; SOFT TISSUE at 08:08

## (undated) DEVICE — LINER SUCTION 3000CC

## (undated) DEVICE — APPLIER CLIP EPIX UNIV 5X34

## (undated) DEVICE — TROCAR ENDOPATH XCEL 5X100MM

## (undated) DEVICE — PACK CUSTOM ENDO CHOLO SLI

## (undated) DEVICE — SEE MEDLINE ITEM 157117

## (undated) DEVICE — TROCAR KII BLLN 12MM 10CM

## (undated) DEVICE — SLEEVE SCD EXPRESS CALF MEDIUM

## (undated) DEVICE — SCISSOR 5MMX35CM DIRECT DRIVE

## (undated) DEVICE — BAG TISS RETRV MONARCH 10MM

## (undated) DEVICE — SUT MONOCRYL 4-0 PS-2

## (undated) DEVICE — SUT 0 VICRYL / UR6 (J603)

## (undated) DEVICE — CLOSURE SKIN STERI STRIP 1/2X4

## (undated) DEVICE — IRRIGATOR SUCTION W/TIP

## (undated) DEVICE — CANNULA ENDOPATH XCEL 5X100MM

## (undated) DEVICE — TROCAR ENDO Z THREAD KII 5X100

## (undated) DEVICE — SOL WATER STRL IRR 1000ML

## (undated) DEVICE — DISSECTOR CURVED 5DCD

## (undated) DEVICE — GLOVE SURG ULTRA TOUCH 7.5

## (undated) DEVICE — NDL SAFETY 21G X 1 1/2 ECLPSE

## (undated) DEVICE — KIT ANTIFOG

## (undated) DEVICE — APPLICATOR CHLORAPREP ORN 26ML

## (undated) DEVICE — STRAP OR TABLE 5IN X 72IN

## (undated) DEVICE — ELECTRODE REM PLYHSV RETURN 9

## (undated) DEVICE — SET TUBE PNEUMOCLEAR SE HI FLO